# Patient Record
Sex: FEMALE | Race: WHITE | Employment: FULL TIME | URBAN - METROPOLITAN AREA
[De-identification: names, ages, dates, MRNs, and addresses within clinical notes are randomized per-mention and may not be internally consistent; named-entity substitution may affect disease eponyms.]

---

## 2023-10-30 PROCEDURE — 99215 OFFICE O/P EST HI 40 MIN: CPT | Performed by: INTERNAL MEDICINE

## 2023-12-04 ENCOUNTER — TELEPHONE (OUTPATIENT)
Dept: SURGICAL ONCOLOGY | Facility: HOSPITAL | Age: 63
End: 2023-12-04
Payer: COMMERCIAL

## 2023-12-04 NOTE — TELEPHONE ENCOUNTER
Called patient on 11/30/2023 to schedule a referral Gavino Beau received   For Dr. Basilio, stating she was a prior Rosaura Flores patient.  Patient stated she did not want to travel.  She wanted to stay within  [facility].  Pt stated she would call back if she decided to schedule  with Dr. Flores.

## 2024-01-24 DIAGNOSIS — R92.8 ABNORMAL FINDING ON BREAST IMAGING: ICD-10-CM

## 2024-01-29 ENCOUNTER — HOSPITAL ENCOUNTER (OUTPATIENT)
Dept: RADIOLOGY | Facility: HOSPITAL | Age: 64
Discharge: HOME | End: 2024-01-29
Payer: COMMERCIAL

## 2024-01-29 ENCOUNTER — OFFICE VISIT (OUTPATIENT)
Dept: SURGICAL ONCOLOGY | Facility: HOSPITAL | Age: 64
End: 2024-01-29
Payer: COMMERCIAL

## 2024-01-29 VITALS
SYSTOLIC BLOOD PRESSURE: 150 MMHG | HEIGHT: 63 IN | HEART RATE: 98 BPM | BODY MASS INDEX: 31.01 KG/M2 | DIASTOLIC BLOOD PRESSURE: 88 MMHG | WEIGHT: 175 LBS

## 2024-01-29 DIAGNOSIS — R97.0 ELEVATED CEA: ICD-10-CM

## 2024-01-29 DIAGNOSIS — R92.8 ABNORMAL FINDING ON BREAST IMAGING: Primary | ICD-10-CM

## 2024-01-29 DIAGNOSIS — C50.811 MALIGNANT NEOPLASM OF OVERLAPPING SITES OF RIGHT BREAST IN FEMALE, ESTROGEN RECEPTOR POSITIVE (MULTI): Primary | ICD-10-CM

## 2024-01-29 DIAGNOSIS — Z17.0 MALIGNANT NEOPLASM OF OVERLAPPING SITES OF RIGHT BREAST IN FEMALE, ESTROGEN RECEPTOR POSITIVE (MULTI): Primary | ICD-10-CM

## 2024-01-29 DIAGNOSIS — N63.11 MASS OF UPPER OUTER QUADRANT OF RIGHT BREAST: ICD-10-CM

## 2024-01-29 DIAGNOSIS — R92.8 ABNORMAL FINDING ON BREAST IMAGING: ICD-10-CM

## 2024-01-29 DIAGNOSIS — K22.719 BARRETT'S ESOPHAGUS WITH DYSPLASIA: ICD-10-CM

## 2024-01-29 PROCEDURE — 99214 OFFICE O/P EST MOD 30 MIN: CPT | Performed by: INTERNAL MEDICINE

## 2024-01-29 PROCEDURE — 76642 ULTRASOUND BREAST LIMITED: CPT | Mod: RT

## 2024-01-29 PROCEDURE — 99214 OFFICE O/P EST MOD 30 MIN: CPT | Mod: 27 | Performed by: SURGERY

## 2024-01-29 PROCEDURE — 99214 OFFICE O/P EST MOD 30 MIN: CPT | Performed by: SURGERY

## 2024-01-29 PROCEDURE — 76982 USE 1ST TARGET LESION: CPT | Mod: RT

## 2024-01-29 PROCEDURE — 76642 ULTRASOUND BREAST LIMITED: CPT | Mod: RIGHT SIDE | Performed by: STUDENT IN AN ORGANIZED HEALTH CARE EDUCATION/TRAINING PROGRAM

## 2024-01-29 RX ORDER — CALCIUM CARBONATE 500(1250)
1250 TABLET ORAL DAILY
COMMUNITY
Start: 2020-08-12

## 2024-01-29 RX ORDER — ATORVASTATIN CALCIUM 10 MG/1
10 TABLET, FILM COATED ORAL DAILY
COMMUNITY

## 2024-01-29 RX ORDER — ALENDRONATE SODIUM 70 MG/1
TABLET ORAL
COMMUNITY
Start: 2021-11-08

## 2024-01-29 RX ORDER — HYDROXYZINE HYDROCHLORIDE 25 MG/1
25 TABLET, FILM COATED ORAL
COMMUNITY
Start: 2021-11-03 | End: 2024-02-16 | Stop reason: ALTCHOICE

## 2024-01-29 RX ORDER — APIXABAN 5 MG/1
5 TABLET, FILM COATED ORAL 2 TIMES DAILY
COMMUNITY

## 2024-01-29 RX ORDER — CEFAZOLIN SODIUM 2 G/50ML
2 SOLUTION INTRAVENOUS ONCE
Status: CANCELLED | OUTPATIENT
Start: 2024-01-29 | End: 2024-01-29

## 2024-01-29 RX ORDER — VENLAFAXINE HYDROCHLORIDE 150 MG/1
150 TABLET, EXTENDED RELEASE ORAL DAILY
COMMUNITY

## 2024-01-29 RX ORDER — ANASTROZOLE 1 MG/1
1 TABLET ORAL NIGHTLY
COMMUNITY

## 2024-01-29 RX ORDER — VENLAFAXINE 75 MG/1
75 TABLET ORAL DAILY
COMMUNITY

## 2024-01-29 NOTE — PROGRESS NOTES
BREAST SURGICAL ONCOLOGY FOLLOW UP     Assessment/Plan     1. 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 family history of breast cancer  -sister with negative panel testing 5-6 years ago         3. Right chest wall cystic lesion favoring post surgical changes.   -will perform excision with resection of redundant skin for definitve diagnosis.    Discussed surgical excision of this area to provide definitive diagnosis to patient and oncology to rule out recurrence.  Discussed risk of wound healing in the setting of radiated tissue. We discussed need for a drain post op- possibly a Prevena.     She is scheduled for excisional biopsy right chest (breast) 3/1/2024.      Carri Porter is a 63 y.o. female presents today for follow up carcinoma of the right breast.     Treatment history  Patient presented with a chief complaint of swelling of the right breast. Was  seen in the ER where an ultrasound was initially performed revealing an abnormal  hypoechoic mass at 12:00 worrisome for possible malignancy. Further evaluation  with mammogram and possible repeat breast imaging was recommended.      She then went on to have bilateral diagnostic mammogram with tomosynthesis and a  targeted bilateral ultrasound on mammogram a spiculated 1.5 x 2.3 cm masses seen  at anterior depth in the right superior lateral breast and a 2.4 x 2.4  spiculated mass seen in the posterior depth of the right medial retroareolar  breast also a vague subsequent centimeter spiculated asymmetry seen in the  anterior depth of the left upper outer breast.     Targeted ultrasound was then performed right breast 11:00 6 cm from the nipple  1.5 x 1.2cm hypoechoic spiculated mass corresponding to the mammographic  asymmetry and sonography at 2:00 8 cm from the nipple right breast  poorly  defined 2 cm hypoechoic mass. In the right axilla there is a 1.9 x 1.1 cm  abnormal lymph node with markedly thickened cortex and acentrically displaced  hilum      Left breast 12:00 3 significant from the nipple there is a 7 x 3 mm cyst  corresponding to the mass mammographic asymmetry.     The findings in the right breast for noted to be highly suspicious (BIRADS 5)  and a 3 site biopsy was recommended.     She also notes recent inversion of the right nipple as well.     Final diagnosis     A. Right axillary lymph node, ultrasound-guided core biopsy: Metastatic carcinoma.  Comment: Tumor deposit measure 7 mm microscopically     B. right breast mass, 11 o'clock, ultrasound-guided core biopsy: Invasive ductal carcinoma, provisional grade 2-3  ER 95% OH 95% HER-2 by IHC equivocal, negative by dual JANY  Comment: tumor measure 1.6cm microscopically     C. right breast mass, 2:00, ultrasound guided core biopsy: Invasive ductal carcinoma, provisional grade 2-3  comment: Tumor measures 2.2 cm microscopically.  ER 95% OH 95% HER-2 negative     Final diagnosis  Right breast skin, skin punch biopsy: Fragments of skin with focal microcalcification negative for malignancy.     Staging work up negative for distant metastatic disease.     Completed neoadjuvant chemotherapy and was scheduled for a right modified radical mastectomy in March when she unfortunately experienced an acute aortic dissection necessitating emergent intervention with endovascular stenting by Dr. Arizmendi. After surgery, she had profound lower extremity weakness and was discharged from the hospital to inpatient rehab. Her case was discussed at our multi disciplinary tumor board and it was recommended she be placed on endocrine therapy while recovering from her aortic procedure.      Presented in May for a regroup. She had been maintained on endocrine therapy since March.      Follow up mammogram and U/S were performed 5/7/2020 and showed a decrease  in both breast masses compared to post neoadjuvant imaging.  Right 2:00 8cmFN measures 1.7 x 1.5 x 1.0cm (previously 2.1 x 1.2 x 2cm)  Right 11:00 6cmFN measures 0.7 x 0.6 x 0.3cm (1.6 x 1.3 x 0.7cm)     Breast is no longer edematous, nipple is everted, no redness of the breast.      8/12/2020 right modified radical mastectomy with wire localized axillary node dissection. 2 foci of residual carcinoma measuring 1mm and 2.3mm with significant therapy effect (90% and 95% within tumor beds). 3/8 lymph nodes positive. Margins negative.     Had some wound healing issues with mastectomy incision. Managed in the wound healing center.      PMRT with Dr. Bella- completed on 12/21/2020.     Doing well since radiation. No further wound issues.     Fell out of the car on 4/1/2021 and broke her right ankle. Non operative management.     Last seen by me in 2021.    Had a PET CT ordered by her oncologist with some vague, low FDG avid areas along the chest wall scar.  Presents today for further evaluation of me.    Targeted U/S of the chest wall today with a cystic lesion along the lateral aspect of the scar favoring post op changes.  Chloe has inquired if additional surgery is feasible to remove this area seen on PET and smooth the contour of the chest wall.      Review of Systems   Constitutional symptoms: Denies generalized fatigue.  Denies weight change, fevers/chills, difficulty sleeping   Eyes: Denies double vision, glaucoma, cataracts.  Ear/nose/throat/mouth: Denies hearing changes, sore throat, sinus problems.  Cardiovascular: No chest pain.  Denies irregular heartbeat.  Denies ankle swelling.  Respiratory: No wheezing, cough, or shortness of breath.  Gastrointestinal: No abdominal pain,  No nausea/vomiting.  No indigestion/heartburn.  No change in bowel habits.  No constipation or diarrhea.   Genitourinary: No urinary incontinence.  No urinary frequency.  No painful urination.  Musculoskeletal: No bone pain, no muscle pain,  no joint pain.   Integumentary: No rash. No masses.  No changes in moles.  No easy bruising.  Neurological: No headaches.  No tremors. No numbness/tingling.  Psychiatric: No anxiety. No depression.  Endocrine: No excessive thirst.  Not too hot or too cold.  Not tired or fatigued.    Hematological/lymphatic: No swollen glands or blood clotting problems.  No bruising.    Objective   Physical Exam  General: Alert and oriented x 3.  Mood and affect are appropriate.  HEENT: EOMI, PERRLA.   Neck: supple, no masses, no cervical adenopathy.  Cardiovascular: no lower extremity edema.  Pulmonary: breathing non labored on room air.  GI: Abdomen soft, no masses. No hepatomegaly or splenomegaly.  Lymph nodes: No supraclavicular or axillary adenopathy bilaterally.  Musculoskeletal: Full range of motion in the upper extremities bilaterally.  Neuro: denies dizziness, tremors    Breasts: The breasts were examined in both the seated and supine positions.    RIGHT: surgically absent.  There is some redundant tissue medially and laterally along the chest wall.  Lateral aspect of incision with history of delayed healing corresponds to the area seen on PET.  Tissues feels firm likely significant scarring though underlying recurrence could be a possibility with her history.  LEFT: The nipple is everted without nipple discharge.  There are no skin changes, skin thickening, or dimpling. There are no masses palpated in the LEFT breast.       Radiology review: All images and reports were personally reviewed.         Rosaura Flores DO

## 2024-02-16 ENCOUNTER — PRE-ADMISSION TESTING (OUTPATIENT)
Dept: PREADMISSION TESTING | Facility: HOSPITAL | Age: 64
End: 2024-02-16
Payer: COMMERCIAL

## 2024-02-16 ENCOUNTER — LAB (OUTPATIENT)
Dept: LAB | Facility: LAB | Age: 64
End: 2024-02-16
Payer: COMMERCIAL

## 2024-02-16 VITALS
HEART RATE: 103 BPM | HEIGHT: 63 IN | BODY MASS INDEX: 30.86 KG/M2 | TEMPERATURE: 97.3 F | OXYGEN SATURATION: 94 % | SYSTOLIC BLOOD PRESSURE: 155 MMHG | DIASTOLIC BLOOD PRESSURE: 86 MMHG | WEIGHT: 174.16 LBS | RESPIRATION RATE: 15 BRPM

## 2024-02-16 DIAGNOSIS — N63.11 MASS OF UPPER OUTER QUADRANT OF RIGHT BREAST: ICD-10-CM

## 2024-02-16 DIAGNOSIS — N60.11 BREAST CHANGES, FIBROCYSTIC, RIGHT: ICD-10-CM

## 2024-02-16 DIAGNOSIS — Z01.818 PREOP EXAMINATION: Primary | ICD-10-CM

## 2024-02-16 LAB
ANION GAP SERPL CALC-SCNC: 10 MMOL/L (ref 10–20)
ATRIAL RATE: 84 BPM
BUN SERPL-MCNC: 13 MG/DL (ref 6–23)
CALCIUM SERPL-MCNC: 9.3 MG/DL (ref 8.6–10.3)
CHLORIDE SERPL-SCNC: 106 MMOL/L (ref 98–107)
CO2 SERPL-SCNC: 30 MMOL/L (ref 21–32)
CREAT SERPL-MCNC: 0.76 MG/DL (ref 0.5–1.05)
EGFRCR SERPLBLD CKD-EPI 2021: 88 ML/MIN/1.73M*2
ERYTHROCYTE [DISTWIDTH] IN BLOOD BY AUTOMATED COUNT: 13.5 % (ref 11.5–14.5)
GLUCOSE SERPL-MCNC: 88 MG/DL (ref 74–99)
HCT VFR BLD AUTO: 45.3 % (ref 36–46)
HGB BLD-MCNC: 14 G/DL (ref 12–16)
MCH RBC QN AUTO: 30.4 PG (ref 26–34)
MCHC RBC AUTO-ENTMCNC: 30.9 G/DL (ref 32–36)
MCV RBC AUTO: 99 FL (ref 80–100)
NRBC BLD-RTO: 0 /100 WBCS (ref 0–0)
P AXIS: 55 DEGREES
P OFFSET: 205 MS
P ONSET: 151 MS
PLATELET # BLD AUTO: 225 X10*3/UL (ref 150–450)
POTASSIUM SERPL-SCNC: 4.3 MMOL/L (ref 3.5–5.3)
PR INTERVAL: 142 MS
Q ONSET: 222 MS
QRS COUNT: 14 BEATS
QRS DURATION: 82 MS
QT INTERVAL: 396 MS
QTC CALCULATION(BAZETT): 467 MS
QTC FREDERICIA: 442 MS
R AXIS: -5 DEGREES
RBC # BLD AUTO: 4.6 X10*6/UL (ref 4–5.2)
SODIUM SERPL-SCNC: 142 MMOL/L (ref 136–145)
T AXIS: 33 DEGREES
T OFFSET: 420 MS
VENTRICULAR RATE: 84 BPM
WBC # BLD AUTO: 6.7 X10*3/UL (ref 4.4–11.3)

## 2024-02-16 PROCEDURE — 99203 OFFICE O/P NEW LOW 30 MIN: CPT | Performed by: NURSE PRACTITIONER

## 2024-02-16 PROCEDURE — 93005 ELECTROCARDIOGRAM TRACING: CPT

## 2024-02-16 PROCEDURE — 80048 BASIC METABOLIC PNL TOTAL CA: CPT

## 2024-02-16 PROCEDURE — 36415 COLL VENOUS BLD VENIPUNCTURE: CPT

## 2024-02-16 PROCEDURE — 85027 COMPLETE CBC AUTOMATED: CPT

## 2024-02-16 PROCEDURE — 93010 ELECTROCARDIOGRAM REPORT: CPT | Performed by: INTERNAL MEDICINE

## 2024-02-16 RX ORDER — LEVOTHYROXINE SODIUM 50 UG/1
50 TABLET ORAL
COMMUNITY

## 2024-02-16 ASSESSMENT — ACTIVITIES OF DAILY LIVING (ADL): ADL_SCORE: 1

## 2024-02-16 ASSESSMENT — CHADS2 SCORE
CHF: NO
HYPERTENSION: NO
AGE GREATER THAN OR EQUAL TO 75: NO
PRIOR STROKE OR TIA OR THROMBOEMBOLISM: NO
CHADS2 SCORE: 0
DIABETES: NO

## 2024-02-16 ASSESSMENT — DUKE ACTIVITY SCORE INDEX (DASI)
CAN YOU DO MODERATE WORK AROUND THE HOUSE LIKE VACUUMING, SWEEPING FLOORS OR CARRYING GROCERIES: YES
CAN YOU DO YARD WORK LIKE RAKING LEAVES, WEEDING OR PUSHING A MOWER: NO
CAN YOU PARTICIPATE IN STRENOUS SPORTS LIKE SWIMMING, SINGLES TENNIS, FOOTBALL, BASKETBALL, OR SKIING: NO
CAN YOU TAKE CARE OF YOURSELF (EAT, DRESS, BATHE, OR USE TOILET): YES
CAN YOU HAVE SEXUAL RELATIONS: NO
CAN YOU DO LIGHT WORK AROUND THE HOUSE LIKE DUSTING OR WASHING DISHES: YES
CAN YOU DO HEAVY WORK AROUND THE HOUSE LIKE SCRUBBING FLOORS OR LIFTING AND MOVING HEAVY FURNITURE: NO
CAN YOU WALK INDOORS, SUCH AS AROUND YOUR HOUSE: YES
DASI METS SCORE: 4.7
CAN YOU WALK A BLOCK OR TWO ON LEVEL GROUND: NO
CAN YOU PARTICIPATE IN MODERATE RECREATIONAL ACTIVITIES LIKE GOLF, BOWLING, DANCING, DOUBLES TENNIS OR THROWING A BASEBALL OR FOOTBALL: NO
CAN YOU RUN A SHORT DISTANCE: NO
TOTAL_SCORE: 16.2
CAN YOU CLIMB A FLIGHT OF STAIRS OR WALK UP A HILL: YES

## 2024-02-16 ASSESSMENT — LIFESTYLE VARIABLES: SMOKING_STATUS: SMOKER

## 2024-02-16 NOTE — H&P (VIEW-ONLY)
CPM/PAT Evaluation       Name: Yecenia Porter (Yecenia Porter)  /Age: 1960/63 y.o.     In-Person     HPI 64 yo female with history of right breast cancer s/p mastectomy  here for preop evaluation for right breast changes on routine PET scan.    Past Medical History:   Diagnosis Date    Anxiety     Mcintyre's esophagus     Breast cancer (CMS/HCC)     Cervical disc disease     Depression     Easy bruising     Hx antineoplastic chemo     Hyperlipidemia     Hypothyroidism     Lumbar disc disease     Personal history of irradiation     Personal history of other mental and behavioral disorders     History of anxiety    Spinal stenosis        Past Surgical History:   Procedure Laterality Date    BACK SURGERY      Cervical and lumbar (pt does not know what was done).     SECTION, LOW TRANSVERSE      COLONOSCOPY      MASTECTOMY Right     ORIF WRIST FRACTURE      OTHER SURGICAL HISTORY      Abdominal aortic stent graft/tube graft for aortic dissection    OTHER SURGICAL HISTORY      Abdominal aortic and bilateral iliac thromboembolectomy    OTHER SURGICAL HISTORY      Right iliac covered stent; left iliac artery covered stent; right common femoral thrombectomy with patch; repair right common femoral artery dissection; repair blood vessel right femoral    UPPER GASTROINTESTINAL ENDOSCOPY         Patient  reports that she is not currently sexually active.    Family History   Problem Relation Name Age of Onset    Other (HTN) Mother      Other (HLD) Mother      Diabetes Father      Heart disease Father      Brain Aneurysm Father      Colon cancer Father      Diabetes Sister      Breast cancer Sister      No Known Problems Maternal Grandmother      No Known Problems Maternal Grandfather      No Known Problems Paternal Grandmother      Diabetes Paternal Grandfather         Allergies   Allergen Reactions    Prozac [Fluoxetine] Agitation       Prior to Admission medications    Medication Sig Start Date End  Date Taking? Authorizing Provider   alendronate (Fosamax) 70 mg tablet See Instructions, TAKE 1 TABLET BY MOUTH EVERY MONDAY 30 MINUTES BEFORE THE AM MEAL WITH WATER. STAY UPRIGHT FOR 30-60 MINUTES AFTER TAKING, 12 tabs, Valerio, [id number], Instructions Replace Required Details, TAKE 1 TABLET BY MOUTH EVERY... 11/8/21   Historical Provider, MD   anastrozole (Arimidex) 1 mg tablet     Historical Provider, MD   atorvastatin (Lipitor) 10 mg tablet     Historical Provider, MD   calcium carbonate (Oyster Shell Calcium 500) 500 mg calcium (1,250 mg) tablet 1 tablet (1,250 mg). 8/12/20   Historical Provider, MD   Eliquis 5 mg tablet Take 1 tablet (5 mg) by mouth 2 times a day.    Historical Provider, MD   venlafaxine (Effexor) 75 mg tablet Take by mouth.    Historical Provider, MD   venlafaxine 150 mg 24 hr tablet     Historical Provider, MD   hydrOXYzine HCL (Atarax) 25 mg tablet 1 tablet (25 mg). 11/3/21 2/16/24  Historical Provider, MD      Constitutional: Negative for fever, chills, or sweats   ENMT: Negative for nasal discharge, congestion, ear pain, mouth pain, throat pain   Respiratory: Negative for cough, wheezing, shortness of breath   Cardiac: Negative for chest pain, dyspnea on exertion, palpitations   Gastrointestinal: Negative for nausea, vomiting, diarrhea, constipation, abdominal pain  Genitourinary: see hpi   Musculoskeletal: Negative for decreased ROM, pain, swelling, weakness   Neurological: Negative for dizziness, confusion, headache  Psychiatric: Negative for mood changes   Skin: Negative for itching, rash, ulcer    Hematologic/Lymph: Negative for bruising, easy bleeding  Allergic/Immunologic: Negative itching, sneezing, swelling     CONSTITUTIONAL - well nourished, well developed, looks older than stated age  SKIN - small area of fungal type rash under right breast prosthesis. normal skin color and pigmentation, no lesions  HEAD - no trauma, normocephalic  EYES - glasses, pupils are equal  and reactive to light, extraocular muscles are intact  ENT - dentures, uvula midline, normal tongue movement and throat normal, no exudate, nasal passage without discharge and patent  NECK - supple without rigidity, full ROM  CHEST - clear to auscultation, no wheezing, no crackles and no rales, good effort  CARDIAC - regular rate and regular rhythm, no skipped beats, no murmur  ABDOMEN - no organomegaly, soft, nontender, nondistended, normal bowel sounds, no guarding/rebound/rigidity   EXTREMITIES - 1+ pitting edema LE, no deformities  NEUROLOGICAL - normal gait, normal balance, normal motor; alert, oriented and no focal signs  PSYCHIATRIC - alert, pleasant and cordial, age-appropriate  IMMUNOLOGIC - no cervical lymphadenopathy     PAT AIRWAY:   Airway:     Mallampati::  II    Neck ROM::  Full   upper dentures and lower dentures      Visit Vitals  /86   Pulse 103   Temp 36.3 °C (97.3 °F) (Temporal)   Resp 15     EKG: NSR HR 84 bpm  DASI Risk Score    No data to display       Caprini DVT Assessment    No data to display       Modified Frailty Index    No data to display       CHADS2 Stroke Risk  Current as of 23 minutes ago        N/A 3 - 100%: High Risk   2 - 3%: Medium Risk   0 - 2%: Low Risk     Last Change: N/A          This score determines the patient's risk of having a stroke if the patient has atrial fibrillation.        This score is not applicable to this patient. Components are not calculated.          Revised Cardiac Risk Index    No data to display       Apfel Simplified Score    No data to display       Risk Analysis Index Results This Encounter    No data found in the last 1 encounters.         Assessment and Plan:   64 yo female scheduled for right excisional breast biopsy with Dr. Flores 3/1/2024. Labs/EKG ordered by surgeon.    See risk scores as previously documented.

## 2024-02-16 NOTE — PREPROCEDURE INSTRUCTIONS
Medication List            Accurate as of February 16, 2024 11:35 AM. Always use your most recent med list.                alendronate 70 mg tablet  Commonly known as: Fosamax  Medication Adjustments for Surgery: Continue until night before surgery     anastrozole 1 mg tablet  Commonly known as: Arimidex  Medication Adjustments for Surgery: Take morning of surgery with sip of water, no other fluids     atorvastatin 10 mg tablet  Commonly known as: Lipitor  Medication Adjustments for Surgery: Take morning of surgery with sip of water, no other fluids     Eliquis 5 mg tablet  Generic drug: apixaban  Medication Adjustments for Surgery: Other (Comment)  Notes to patient: Per provider     levothyroxine 50 mcg tablet  Commonly known as: Synthroid, Levoxyl  Medication Adjustments for Surgery: Take morning of surgery with sip of water, no other fluids     Oyster Shell Calcium 500 500 mg calcium (1,250 mg) tablet  Generic drug: calcium carbonate  Medication Adjustments for Surgery: Stop 7 days before surgery     * venlafaxine 150 mg 24 hr tablet     * venlafaxine 75 mg tablet  Commonly known as: Effexor  Medication Adjustments for Surgery: Take morning of surgery with sip of water, no other fluids           * This list has 2 medication(s) that are the same as other medications prescribed for you. Read the directions carefully, and ask your doctor or other care provider to review them with you.                                           PRE-OPERATIVE INSTRUCTIONS    You will receive notification one business day prior to your surgery to confirm your arrival time and additional information. It is important that you answer your phone and/or check your messages during this time.    Please enter the building through the Outpatient entrance. Take the elevator off the lobby to the 2nd floor and check in at the Outpatient Surgery desk    INSTRUCTIONS:  Talk to your surgeon for instructions if you should stop your aspirin, blood  thinner, or diabetes medicines.  DO NOT take any multivitamins or over the counter supplements for 7-10 days before surgery.  If not being admitted, you must have an adult immediately available to drive you home after surgery. We also highly recommend you have someone stay with you for the entire day and night of your surgery.  For children having surgery, a parent or legal guardian must accompany them to the surgery center. If this is not possible, please call [phone] to make additional arrangements.  For adults who are unable to consent or make medical decisions for themselves, a legal guardian or Power of  must accompany them to the surgery center. If this is not possible, please call [phone] to make additional arrangements.  Wear comfortable, loose fitting clothing.  All jewelry and piercings must be removed. If you are unable to remove an item or have a dermal piercing, please be sure to tell the nurse when you arrive for surgery.  Nail polish and make-up must be removed.  Avoid smoking or consuming alcohol for 24 hours before surgery.  To help prevent infection, please take a shower/bath and wash your hair the night before and/or morning of surgery.    Additional instructions about eating and drinking before surgery:  Do not eat any solid foods after midnight. Milk, nutritional drinks/supplements, and infant formula are considered solid foods.  You may drink up to 12 oz. of clear liquids up to 2 hours before your arrival time for surgery, unless directed otherwise by your surgeon. Clear liquids include water, non-carbonated sports drinks (Gatorade), black tea or coffee (no creamers) and breast milk.    If you received a blue folder, please review additional information provided inside the folder regarding additional preparation.     If you have any questions or concerns, please call Pre-Admission Testing at [phone].                                  Preoperative Bathing  instructions    Follow these instructions the evening before and morning of surgery:  Do not shave the day before or day of surgery.  Remove all jewelry until after surgery. Take off rings and take out all body-piercing jewelry.  Use a clean wash cloth and towel.  Wash your face and hair with your normal soap and shampoo before you use the CHG soap.  Use a wash cloth to clean your skin with the CHG soap. Use enough CHG soap to cover the skin on your whole body. Use the same amount as you would with your normal soap.  Do not use the CHG soap on your face, eyes, ears, or head.  Do not scrub your skin too hard.  Be sure to clean the area well where surgery will be done.  Do not wash with your normal soap after the CHG soap.  Keep away from the water stream when you put CHG soap on. This keeps it from rinsing off too soon.  Rinse your body well.  Pat yourself dry with a clean, soft towel.  Put on clean clothing.  Do not put on any deodorants, lotions, or oils after showering. These might block how the CHG soap works.

## 2024-02-16 NOTE — CPM/PAT H&P
CPM/PAT Evaluation       Name: Yecenia Porter (Yecenia Porter)  /Age: 1960/63 y.o.     In-Person     HPI 62 yo female with history of right breast cancer s/p mastectomy  here for preop evaluation for right breast changes on routine PET scan.    Past Medical History:   Diagnosis Date    Anxiety     Mcintyre's esophagus     Breast cancer (CMS/HCC)     Cervical disc disease     Depression     Easy bruising     Hx antineoplastic chemo     Hyperlipidemia     Hypothyroidism     Lumbar disc disease     Personal history of irradiation     Personal history of other mental and behavioral disorders     History of anxiety    Spinal stenosis        Past Surgical History:   Procedure Laterality Date    BACK SURGERY      Cervical and lumbar (pt does not know what was done).     SECTION, LOW TRANSVERSE      COLONOSCOPY      MASTECTOMY Right     ORIF WRIST FRACTURE      OTHER SURGICAL HISTORY      Abdominal aortic stent graft/tube graft for aortic dissection    OTHER SURGICAL HISTORY      Abdominal aortic and bilateral iliac thromboembolectomy    OTHER SURGICAL HISTORY      Right iliac covered stent; left iliac artery covered stent; right common femoral thrombectomy with patch; repair right common femoral artery dissection; repair blood vessel right femoral    UPPER GASTROINTESTINAL ENDOSCOPY         Patient  reports that she is not currently sexually active.    Family History   Problem Relation Name Age of Onset    Other (HTN) Mother      Other (HLD) Mother      Diabetes Father      Heart disease Father      Brain Aneurysm Father      Colon cancer Father      Diabetes Sister      Breast cancer Sister      No Known Problems Maternal Grandmother      No Known Problems Maternal Grandfather      No Known Problems Paternal Grandmother      Diabetes Paternal Grandfather         Allergies   Allergen Reactions    Prozac [Fluoxetine] Agitation       Prior to Admission medications    Medication Sig Start Date End  Date Taking? Authorizing Provider   alendronate (Fosamax) 70 mg tablet See Instructions, TAKE 1 TABLET BY MOUTH EVERY MONDAY 30 MINUTES BEFORE THE AM MEAL WITH WATER. STAY UPRIGHT FOR 30-60 MINUTES AFTER TAKING, 12 tabs, Valerio, [id number], Instructions Replace Required Details, TAKE 1 TABLET BY MOUTH EVERY... 11/8/21   Historical Provider, MD   anastrozole (Arimidex) 1 mg tablet     Historical Provider, MD   atorvastatin (Lipitor) 10 mg tablet     Historical Provider, MD   calcium carbonate (Oyster Shell Calcium 500) 500 mg calcium (1,250 mg) tablet 1 tablet (1,250 mg). 8/12/20   Historical Provider, MD   Eliquis 5 mg tablet Take 1 tablet (5 mg) by mouth 2 times a day.    Historical Provider, MD   venlafaxine (Effexor) 75 mg tablet Take by mouth.    Historical Provider, MD   venlafaxine 150 mg 24 hr tablet     Historical Provider, MD   hydrOXYzine HCL (Atarax) 25 mg tablet 1 tablet (25 mg). 11/3/21 2/16/24  Historical Provider, MD      Constitutional: Negative for fever, chills, or sweats   ENMT: Negative for nasal discharge, congestion, ear pain, mouth pain, throat pain   Respiratory: Negative for cough, wheezing, shortness of breath   Cardiac: Negative for chest pain, dyspnea on exertion, palpitations   Gastrointestinal: Negative for nausea, vomiting, diarrhea, constipation, abdominal pain  Genitourinary: see hpi   Musculoskeletal: Negative for decreased ROM, pain, swelling, weakness   Neurological: Negative for dizziness, confusion, headache  Psychiatric: Negative for mood changes   Skin: Negative for itching, rash, ulcer    Hematologic/Lymph: Negative for bruising, easy bleeding  Allergic/Immunologic: Negative itching, sneezing, swelling     CONSTITUTIONAL - well nourished, well developed, looks older than stated age  SKIN - small area of fungal type rash under right breast prosthesis. normal skin color and pigmentation, no lesions  HEAD - no trauma, normocephalic  EYES - glasses, pupils are equal  and reactive to light, extraocular muscles are intact  ENT - dentures, uvula midline, normal tongue movement and throat normal, no exudate, nasal passage without discharge and patent  NECK - supple without rigidity, full ROM  CHEST - clear to auscultation, no wheezing, no crackles and no rales, good effort  CARDIAC - regular rate and regular rhythm, no skipped beats, no murmur  ABDOMEN - no organomegaly, soft, nontender, nondistended, normal bowel sounds, no guarding/rebound/rigidity   EXTREMITIES - 1+ pitting edema LE, no deformities  NEUROLOGICAL - normal gait, normal balance, normal motor; alert, oriented and no focal signs  PSYCHIATRIC - alert, pleasant and cordial, age-appropriate  IMMUNOLOGIC - no cervical lymphadenopathy     PAT AIRWAY:   Airway:     Mallampati::  II    Neck ROM::  Full   upper dentures and lower dentures      Visit Vitals  /86   Pulse 103   Temp 36.3 °C (97.3 °F) (Temporal)   Resp 15     EKG: NSR HR 84 bpm  DASI Risk Score    No data to display       Caprini DVT Assessment    No data to display       Modified Frailty Index    No data to display       CHADS2 Stroke Risk  Current as of 23 minutes ago        N/A 3 - 100%: High Risk   2 - 3%: Medium Risk   0 - 2%: Low Risk     Last Change: N/A          This score determines the patient's risk of having a stroke if the patient has atrial fibrillation.        This score is not applicable to this patient. Components are not calculated.          Revised Cardiac Risk Index    No data to display       Apfel Simplified Score    No data to display       Risk Analysis Index Results This Encounter    No data found in the last 1 encounters.         Assessment and Plan:   64 yo female scheduled for right excisional breast biopsy with Dr. Flores 3/1/2024. Labs/EKG ordered by surgeon.    See risk scores as previously documented.

## 2024-03-01 ENCOUNTER — HOSPITAL ENCOUNTER (OUTPATIENT)
Facility: HOSPITAL | Age: 64
Setting detail: OUTPATIENT SURGERY
Discharge: HOME | End: 2024-03-01
Attending: SURGERY | Admitting: SURGERY
Payer: COMMERCIAL

## 2024-03-01 ENCOUNTER — ANESTHESIA (OUTPATIENT)
Dept: OPERATING ROOM | Facility: HOSPITAL | Age: 64
End: 2024-03-01
Payer: COMMERCIAL

## 2024-03-01 ENCOUNTER — ANESTHESIA EVENT (OUTPATIENT)
Dept: OPERATING ROOM | Facility: HOSPITAL | Age: 64
End: 2024-03-01
Payer: COMMERCIAL

## 2024-03-01 VITALS
WEIGHT: 173 LBS | RESPIRATION RATE: 16 BRPM | HEART RATE: 93 BPM | OXYGEN SATURATION: 95 % | HEIGHT: 63 IN | SYSTOLIC BLOOD PRESSURE: 128 MMHG | TEMPERATURE: 97.5 F | BODY MASS INDEX: 30.65 KG/M2 | DIASTOLIC BLOOD PRESSURE: 64 MMHG

## 2024-03-01 DIAGNOSIS — R92.8 ABNORMAL FINDING ON BREAST IMAGING: Primary | ICD-10-CM

## 2024-03-01 DIAGNOSIS — N63.11 MASS OF UPPER OUTER QUADRANT OF RIGHT BREAST: ICD-10-CM

## 2024-03-01 PROCEDURE — 2500000004 HC RX 250 GENERAL PHARMACY W/ HCPCS (ALT 636 FOR OP/ED): Performed by: SURGERY

## 2024-03-01 PROCEDURE — 19120 REMOVAL OF BREAST LESION: CPT | Performed by: SURGERY

## 2024-03-01 PROCEDURE — 7100000010 HC PHASE TWO TIME - EACH INCREMENTAL 1 MINUTE: Performed by: SURGERY

## 2024-03-01 PROCEDURE — 2780000003 HC OR 278 NO HCPCS: Performed by: SURGERY

## 2024-03-01 PROCEDURE — C1889 IMPLANT/INSERT DEVICE, NOC: HCPCS | Performed by: SURGERY

## 2024-03-01 PROCEDURE — 2500000005 HC RX 250 GENERAL PHARMACY W/O HCPCS: Performed by: NURSE ANESTHETIST, CERTIFIED REGISTERED

## 2024-03-01 PROCEDURE — 3600000003 HC OR TIME - INITIAL BASE CHARGE - PROCEDURE LEVEL THREE: Performed by: SURGERY

## 2024-03-01 PROCEDURE — 2500000005 HC RX 250 GENERAL PHARMACY W/O HCPCS: Performed by: SURGERY

## 2024-03-01 PROCEDURE — A19120 PR EXCISE BREAST CYST: Performed by: NURSE ANESTHETIST, CERTIFIED REGISTERED

## 2024-03-01 PROCEDURE — 3700000002 HC GENERAL ANESTHESIA TIME - EACH INCREMENTAL 1 MINUTE: Performed by: SURGERY

## 2024-03-01 PROCEDURE — A19120 PR EXCISE BREAST CYST: Performed by: ANESTHESIOLOGY

## 2024-03-01 PROCEDURE — 2500000004 HC RX 250 GENERAL PHARMACY W/ HCPCS (ALT 636 FOR OP/ED): Performed by: NURSE ANESTHETIST, CERTIFIED REGISTERED

## 2024-03-01 PROCEDURE — 3700000001 HC GENERAL ANESTHESIA TIME - INITIAL BASE CHARGE: Performed by: SURGERY

## 2024-03-01 PROCEDURE — 2500000004 HC RX 250 GENERAL PHARMACY W/ HCPCS (ALT 636 FOR OP/ED): Performed by: ANESTHESIOLOGY

## 2024-03-01 PROCEDURE — 88307 TISSUE EXAM BY PATHOLOGIST: CPT | Performed by: PATHOLOGY

## 2024-03-01 PROCEDURE — 7100000001 HC RECOVERY ROOM TIME - INITIAL BASE CHARGE: Performed by: SURGERY

## 2024-03-01 PROCEDURE — 2500000002 HC RX 250 W HCPCS SELF ADMINISTERED DRUGS (ALT 637 FOR MEDICARE OP, ALT 636 FOR OP/ED): Performed by: NURSE ANESTHETIST, CERTIFIED REGISTERED

## 2024-03-01 PROCEDURE — 7100000002 HC RECOVERY ROOM TIME - EACH INCREMENTAL 1 MINUTE: Performed by: SURGERY

## 2024-03-01 PROCEDURE — 2720000007 HC OR 272 NO HCPCS: Performed by: SURGERY

## 2024-03-01 PROCEDURE — 88307 TISSUE EXAM BY PATHOLOGIST: CPT | Mod: TC,STJLAB | Performed by: SURGERY

## 2024-03-01 PROCEDURE — 7100000009 HC PHASE TWO TIME - INITIAL BASE CHARGE: Performed by: SURGERY

## 2024-03-01 PROCEDURE — 3600000008 HC OR TIME - EACH INCREMENTAL 1 MINUTE - PROCEDURE LEVEL THREE: Performed by: SURGERY

## 2024-03-01 DEVICE — IMPLANTABLE DEVICE: Type: IMPLANTABLE DEVICE | Site: BREAST | Status: FUNCTIONAL

## 2024-03-01 RX ORDER — HYDROCODONE BITARTRATE AND ACETAMINOPHEN 5; 325 MG/1; MG/1
1 TABLET ORAL EVERY 6 HOURS PRN
Qty: 20 TABLET | Refills: 0 | Status: SHIPPED | OUTPATIENT
Start: 2024-03-01 | End: 2024-03-08

## 2024-03-01 RX ORDER — HYDROCODONE BITARTRATE AND ACETAMINOPHEN 5; 325 MG/1; MG/1
1 TABLET ORAL EVERY 4 HOURS PRN
Status: DISCONTINUED | OUTPATIENT
Start: 2024-03-01 | End: 2024-03-01 | Stop reason: HOSPADM

## 2024-03-01 RX ORDER — PROPOFOL 10 MG/ML
INJECTION, EMULSION INTRAVENOUS AS NEEDED
Status: DISCONTINUED | OUTPATIENT
Start: 2024-03-01 | End: 2024-03-01

## 2024-03-01 RX ORDER — BUPIVACAINE HYDROCHLORIDE 2.5 MG/ML
INJECTION, SOLUTION INFILTRATION; PERINEURAL AS NEEDED
Status: DISCONTINUED | OUTPATIENT
Start: 2024-03-01 | End: 2024-03-01 | Stop reason: HOSPADM

## 2024-03-01 RX ORDER — KETOROLAC TROMETHAMINE 30 MG/ML
INJECTION, SOLUTION INTRAMUSCULAR; INTRAVENOUS AS NEEDED
Status: DISCONTINUED | OUTPATIENT
Start: 2024-03-01 | End: 2024-03-01

## 2024-03-01 RX ORDER — MIDAZOLAM HYDROCHLORIDE 1 MG/ML
INJECTION, SOLUTION INTRAMUSCULAR; INTRAVENOUS AS NEEDED
Status: DISCONTINUED | OUTPATIENT
Start: 2024-03-01 | End: 2024-03-01

## 2024-03-01 RX ORDER — LABETALOL HYDROCHLORIDE 5 MG/ML
5 INJECTION, SOLUTION INTRAVENOUS
Status: DISCONTINUED | OUTPATIENT
Start: 2024-03-01 | End: 2024-03-01 | Stop reason: HOSPADM

## 2024-03-01 RX ORDER — ALBUTEROL SULFATE 0.83 MG/ML
2.5 SOLUTION RESPIRATORY (INHALATION)
Status: DISCONTINUED | OUTPATIENT
Start: 2024-03-01 | End: 2024-03-01 | Stop reason: HOSPADM

## 2024-03-01 RX ORDER — DIPHENHYDRAMINE HYDROCHLORIDE 50 MG/ML
12.5 INJECTION INTRAMUSCULAR; INTRAVENOUS ONCE AS NEEDED
Status: DISCONTINUED | OUTPATIENT
Start: 2024-03-01 | End: 2024-03-01 | Stop reason: HOSPADM

## 2024-03-01 RX ORDER — MIDAZOLAM HYDROCHLORIDE 1 MG/ML
1 INJECTION, SOLUTION INTRAMUSCULAR; INTRAVENOUS ONCE AS NEEDED
Status: DISCONTINUED | OUTPATIENT
Start: 2024-03-01 | End: 2024-03-01 | Stop reason: HOSPADM

## 2024-03-01 RX ORDER — LIDOCAINE HYDROCHLORIDE 20 MG/ML
INJECTION, SOLUTION EPIDURAL; INFILTRATION; INTRACAUDAL; PERINEURAL AS NEEDED
Status: DISCONTINUED | OUTPATIENT
Start: 2024-03-01 | End: 2024-03-01

## 2024-03-01 RX ORDER — SODIUM CHLORIDE, SODIUM LACTATE, POTASSIUM CHLORIDE, CALCIUM CHLORIDE 600; 310; 30; 20 MG/100ML; MG/100ML; MG/100ML; MG/100ML
100 INJECTION, SOLUTION INTRAVENOUS CONTINUOUS
Status: DISCONTINUED | OUTPATIENT
Start: 2024-03-01 | End: 2024-03-01 | Stop reason: HOSPADM

## 2024-03-01 RX ORDER — ROCURONIUM BROMIDE 50 MG/5 ML
SYRINGE (ML) INTRAVENOUS AS NEEDED
Status: DISCONTINUED | OUTPATIENT
Start: 2024-03-01 | End: 2024-03-01

## 2024-03-01 RX ORDER — FENTANYL CITRATE 50 UG/ML
INJECTION, SOLUTION INTRAMUSCULAR; INTRAVENOUS AS NEEDED
Status: DISCONTINUED | OUTPATIENT
Start: 2024-03-01 | End: 2024-03-01

## 2024-03-01 RX ORDER — HYDROMORPHONE HYDROCHLORIDE 1 MG/ML
0.5 INJECTION, SOLUTION INTRAMUSCULAR; INTRAVENOUS; SUBCUTANEOUS EVERY 5 MIN PRN
Status: DISCONTINUED | OUTPATIENT
Start: 2024-03-01 | End: 2024-03-01 | Stop reason: HOSPADM

## 2024-03-01 RX ORDER — ONDANSETRON HYDROCHLORIDE 2 MG/ML
INJECTION, SOLUTION INTRAVENOUS AS NEEDED
Status: DISCONTINUED | OUTPATIENT
Start: 2024-03-01 | End: 2024-03-01

## 2024-03-01 RX ORDER — HYDROMORPHONE HYDROCHLORIDE 1 MG/ML
1 INJECTION, SOLUTION INTRAMUSCULAR; INTRAVENOUS; SUBCUTANEOUS EVERY 5 MIN PRN
Status: DISCONTINUED | OUTPATIENT
Start: 2024-03-01 | End: 2024-03-01 | Stop reason: HOSPADM

## 2024-03-01 RX ORDER — SODIUM CHLORIDE, SODIUM LACTATE, POTASSIUM CHLORIDE, CALCIUM CHLORIDE 600; 310; 30; 20 MG/100ML; MG/100ML; MG/100ML; MG/100ML
INJECTION, SOLUTION INTRAVENOUS CONTINUOUS PRN
Status: DISCONTINUED | OUTPATIENT
Start: 2024-03-01 | End: 2024-03-01

## 2024-03-01 RX ORDER — METOCLOPRAMIDE HYDROCHLORIDE 5 MG/ML
10 INJECTION INTRAMUSCULAR; INTRAVENOUS ONCE AS NEEDED
Status: DISCONTINUED | OUTPATIENT
Start: 2024-03-01 | End: 2024-03-01 | Stop reason: HOSPADM

## 2024-03-01 RX ORDER — PHENYLEPHRINE HCL IN 0.9% NACL 1 MG/10 ML
SYRINGE (ML) INTRAVENOUS AS NEEDED
Status: DISCONTINUED | OUTPATIENT
Start: 2024-03-01 | End: 2024-03-01

## 2024-03-01 RX ORDER — HYDRALAZINE HYDROCHLORIDE 20 MG/ML
5 INJECTION INTRAMUSCULAR; INTRAVENOUS EVERY 30 MIN PRN
Status: DISCONTINUED | OUTPATIENT
Start: 2024-03-01 | End: 2024-03-01 | Stop reason: HOSPADM

## 2024-03-01 RX ORDER — CEFAZOLIN SODIUM 2 G/100ML
2 INJECTION, SOLUTION INTRAVENOUS ONCE
Status: COMPLETED | OUTPATIENT
Start: 2024-03-01 | End: 2024-03-01

## 2024-03-01 RX ORDER — DEXAMETHASONE SODIUM PHOSPHATE 4 MG/ML
INJECTION, SOLUTION INTRA-ARTICULAR; INTRALESIONAL; INTRAMUSCULAR; INTRAVENOUS; SOFT TISSUE AS NEEDED
Status: DISCONTINUED | OUTPATIENT
Start: 2024-03-01 | End: 2024-03-01

## 2024-03-01 RX ORDER — ALBUTEROL SULFATE 90 UG/1
AEROSOL, METERED RESPIRATORY (INHALATION) AS NEEDED
Status: DISCONTINUED | OUTPATIENT
Start: 2024-03-01 | End: 2024-03-01

## 2024-03-01 RX ADMIN — PROPOFOL 50 MG: 10 INJECTION, EMULSION INTRAVENOUS at 07:49

## 2024-03-01 RX ADMIN — SODIUM CHLORIDE, POTASSIUM CHLORIDE, SODIUM LACTATE AND CALCIUM CHLORIDE: 600; 310; 30; 20 INJECTION, SOLUTION INTRAVENOUS at 07:28

## 2024-03-01 RX ADMIN — ALBUTEROL SULFATE 10 PUFF: 108 INHALANT RESPIRATORY (INHALATION) at 07:52

## 2024-03-01 RX ADMIN — DEXAMETHASONE SODIUM PHOSPHATE 4 MG: 4 INJECTION INTRA-ARTICULAR; INTRALESIONAL; INTRAMUSCULAR; INTRAVENOUS; SOFT TISSUE at 07:42

## 2024-03-01 RX ADMIN — HYDROMORPHONE HYDROCHLORIDE 0.5 MG: 1 INJECTION, SOLUTION INTRAMUSCULAR; INTRAVENOUS; SUBCUTANEOUS at 09:42

## 2024-03-01 RX ADMIN — ALBUTEROL SULFATE 10 PUFF: 108 INHALANT RESPIRATORY (INHALATION) at 07:50

## 2024-03-01 RX ADMIN — Medication 100 MCG: at 08:18

## 2024-03-01 RX ADMIN — Medication 30 MG: at 07:49

## 2024-03-01 RX ADMIN — ONDANSETRON 4 MG: 2 INJECTION, SOLUTION INTRAMUSCULAR; INTRAVENOUS at 08:20

## 2024-03-01 RX ADMIN — Medication 20 MG: at 07:46

## 2024-03-01 RX ADMIN — LIDOCAINE HYDROCHLORIDE 80 MG: 20 INJECTION, SOLUTION EPIDURAL; INFILTRATION; INTRACAUDAL; PERINEURAL at 07:32

## 2024-03-01 RX ADMIN — KETOROLAC TROMETHAMINE 30 MG: 30 INJECTION, SOLUTION INTRAMUSCULAR; INTRAVENOUS at 09:04

## 2024-03-01 RX ADMIN — Medication 100 MCG: at 08:21

## 2024-03-01 RX ADMIN — FENTANYL CITRATE 50 MCG: 50 INJECTION, SOLUTION INTRAMUSCULAR; INTRAVENOUS at 07:48

## 2024-03-01 RX ADMIN — PROPOFOL 150 MG: 10 INJECTION, EMULSION INTRAVENOUS at 07:32

## 2024-03-01 RX ADMIN — CEFAZOLIN SODIUM 2 G: 2 INJECTION, SOLUTION INTRAVENOUS at 07:40

## 2024-03-01 RX ADMIN — MIDAZOLAM 2 MG: 1 INJECTION INTRAMUSCULAR; INTRAVENOUS at 07:28

## 2024-03-01 RX ADMIN — FENTANYL CITRATE 50 MCG: 50 INJECTION, SOLUTION INTRAMUSCULAR; INTRAVENOUS at 08:08

## 2024-03-01 SDOH — HEALTH STABILITY: MENTAL HEALTH: CURRENT SMOKER: 1

## 2024-03-01 ASSESSMENT — COLUMBIA-SUICIDE SEVERITY RATING SCALE - C-SSRS
2. HAVE YOU ACTUALLY HAD ANY THOUGHTS OF KILLING YOURSELF?: NO
1. IN THE PAST MONTH, HAVE YOU WISHED YOU WERE DEAD OR WISHED YOU COULD GO TO SLEEP AND NOT WAKE UP?: NO
6. HAVE YOU EVER DONE ANYTHING, STARTED TO DO ANYTHING, OR PREPARED TO DO ANYTHING TO END YOUR LIFE?: NO

## 2024-03-01 ASSESSMENT — PAIN - FUNCTIONAL ASSESSMENT
PAIN_FUNCTIONAL_ASSESSMENT: 0-10

## 2024-03-01 ASSESSMENT — PAIN SCALES - GENERAL
PAINLEVEL_OUTOF10: 0 - NO PAIN
PAINLEVEL_OUTOF10: 0 - NO PAIN
PAINLEVEL_OUTOF10: 4
PAINLEVEL_OUTOF10: 4
PAINLEVEL_OUTOF10: 6
PAINLEVEL_OUTOF10: 0 - NO PAIN
PAINLEVEL_OUTOF10: 6

## 2024-03-01 ASSESSMENT — PAIN DESCRIPTION - ORIENTATION: ORIENTATION: RIGHT

## 2024-03-01 ASSESSMENT — PAIN DESCRIPTION - LOCATION: LOCATION: BREAST

## 2024-03-01 ASSESSMENT — PAIN DESCRIPTION - DESCRIPTORS: DESCRIPTORS: SORE

## 2024-03-01 NOTE — DISCHARGE INSTRUCTIONS
DR. FARRIS'S BREAST SURGERY DISCHARGE INSTRUCTIONS    Wound Care    Do not remove the surgical bra for 24 hours.  Wear the bra to bed to reduce movement.  Your incisions are covered with steri strips.  Leave these in place until they begin to lift from the edges.  If steri strips are still in place after 10 days you may remove the strips.  You can place ice on your breast as needed for pain relief  (20 minutes on / 20 minutes off).    Activity    You may shower after 24 hours, but no baths, tubs, or swimming for 2 weeks.  Do not lift anything more than 10lbs or do any strenuous activity until seen in post op.  You may drive when you are not taking narcotic pain medication.    Medication    Take prescription  narcotic medication for severe pain.  Do not drive while taking narcotics.  You may take acetaminophen (Tylenol), ibuprofen (Motrin), or naproxen (Aleve) for pain.    When to Call    Your wound is red, swollen, or has a lot of bleeding or drainage.  Your pain is not controlled by the medicines.  You have a fever or 100F or greater.    Drains    Empty and record output from each drain three times a day.  Ok to shower with drains.  Keep drain site covered with clear adhesive dressing (Tegaderm) or gauze.  Bring record of your drain output to your post operative visit for Dr. Farris to review.    Prevena    You have a negative pressure dressing to help heal your incision.  The purple foam should remain contracted down and the vac should remain on at all times.  Plug vac into a wall outlet at night and when you are at home.  Vac battery can last for several hours unplugged.  Vac will automatically shut off after 7 days and will need to be removed in the office.      Follow Up    Dr. Farris will review your pathology results at your post-op visit.  Please call the office at [phone] to make a follow up appointment.

## 2024-03-01 NOTE — ANESTHESIA PREPROCEDURE EVALUATION
Patient: Yecenia Porter    Procedure Information       Date/Time: 24    Procedure: RIGHT EXCISIONAL BREAST BIOPSY (Right)    Location: [facility] OR 07 / [facility]    Surgeons: Rosaura Flores, DO            Relevant Problems   No relevant active problems       Clinical information reviewed:    Allergies  Meds               NPO Detail:  NPO/Void Status  Carbohydrate Drink Given Prior to Surgery? : N  Date of Last Liquid: 24  Time of Last Liquid:   Date of Last Solid: 24  Time of Last Solid:   Last Intake Type: Clear fluids  Time of Last Void: 600         Physical Exam    Airway  Mallampati: II  TM distance: >3 FB  Neck ROM: full     Cardiovascular - normal exam     Dental   (+) upper dentures, lower dentures     Pulmonary - normal exam     Abdominal - normal exam           Vitals:    24 0700   BP: 129/63   Pulse: 84   Resp: 16   Temp: 36.6 °C (97.9 °F)   SpO2: 96%       Past Surgical History:   Procedure Laterality Date    BACK SURGERY      Cervical and lumbar (pt does not know what was done).     SECTION, LOW TRANSVERSE      COLONOSCOPY      MASTECTOMY Right 2020    ORIF WRIST FRACTURE      OTHER SURGICAL HISTORY      Abdominal aortic stent graft/tube graft for aortic dissection    OTHER SURGICAL HISTORY      Abdominal aortic and bilateral iliac thromboembolectomy    OTHER SURGICAL HISTORY      Right iliac covered stent; left iliac artery covered stent; right common femoral thrombectomy with patch; repair right common femoral artery dissection; repair blood vessel right femoral    UPPER GASTROINTESTINAL ENDOSCOPY       Past Medical History:   Diagnosis Date    Anxiety     Mcintyre's esophagus     Breast cancer (CMS/HCC)     Cervical disc disease     Depression     Easy bruising     Hx antineoplastic chemo     Hyperlipidemia     Hypothyroidism     Lumbar disc disease     Personal history of irradiation     Personal history of other mental and behavioral disorders      History of anxiety    Spinal stenosis        Current Facility-Administered Medications:     ceFAZolin in dextrose (iso-os) (Ancef) IVPB 2 g, 2 g, intravenous, Once, Rosaura Flores DO    Facility-Administered Medications Ordered in Other Encounters:     lactated Ringer's infusion, , intravenous, Continuous PRN, Agnes Kevin, APRN-CRNA, New Bag at 03/01/24 0721  Prior to Admission medications    Medication Sig Start Date End Date Taking? Authorizing Provider   alendronate (Fosamax) 70 mg tablet See Instructions, TAKE 1 TABLET BY MOUTH EVERY MONDAY 30 MINUTES BEFORE THE AM MEAL WITH WATER. STAY UPRIGHT FOR 30-60 MINUTES AFTER TAKING, 12 tabs, , [id number], Instructions Replace Required Details, TAKE 1 TABLET BY MOUTH EVERY... 11/8/21  Yes Historical Provider, MD   anastrozole (Arimidex) 1 mg tablet 1 tablet (1 mg total) once daily at bedtime.   Yes Historical Provider, MD   atorvastatin (Lipitor) 10 mg tablet Take 1 tablet (10 mg) by mouth once daily.   Yes Historical Provider, MD   calcium carbonate (Oyster Shell Calcium 500) 500 mg calcium (1,250 mg) tablet Take 1 tablet (1,250 mg) by mouth once daily. 8/12/20  Yes Historical Provider, MD   Eliquis 5 mg tablet Take 1 tablet (5 mg) by mouth 2 times a day.   Yes Historical Provider, MD   levothyroxine (Synthroid, Levoxyl) 50 mcg tablet Take 1 tablet (50 mcg) by mouth once daily in the morning. Take before meals.   Yes Historical Provider, MD   venlafaxine (Effexor) 75 mg tablet Take 1 tablet (75 mg) by mouth once daily.   Yes Historical Provider, MD   venlafaxine 150 mg 24 hr tablet 1 tablet (150 mg) once daily.   Yes Historical Provider, MD     Allergies   Allergen Reactions    Prozac [Fluoxetine] Agitation     Social History     Tobacco Use    Smoking status: Every Day     Packs/day: .25     Types: Cigarettes     Passive exposure: Past    Smokeless tobacco: Never   Substance Use Topics    Alcohol use: Never         Chemistry    Lab Results  "  Component Value Date/Time     02/16/2024 1152    K 4.3 02/16/2024 1152     02/16/2024 1152    CO2 30 02/16/2024 1152    BUN 13 02/16/2024 1152    CREATININE 0.76 02/16/2024 1152    Lab Results   Component Value Date/Time    CALCIUM 9.3 02/16/2024 1152          Lab Results   Component Value Date/Time    WBC 6.7 02/16/2024 1152    HGB 14.0 02/16/2024 1152    HCT 45.3 02/16/2024 1152     02/16/2024 1152     No results found for: \"PROTIME\", \"PTT\", \"INR\"  Encounter Date: 02/16/24   ECG 12 lead (Ancillary Performed)   Result Value    Ventricular Rate 84    Atrial Rate 84    TN Interval 142    QRS Duration 82    QT Interval 396    QTC Calculation(Bazett) 467    P Axis 55    R Axis -5    T Axis 33    QRS Count 14    Q Onset 222    P Onset 151    P Offset 205    T Offset 420    QTC Fredericia 442    Narrative    Normal sinus rhythm  Possible Left atrial enlargement  RSR' pattern in V1  Borderline ECG  No previous ECGs available  Confirmed by Sancho Carey ([id number]) on 2/16/2024 4:34:43 PM        Anesthesia Plan    History of general anesthesia?: yes  History of complications of general anesthesia?: no    ASA 2     general     The patient is a current smoker.  Patient was previously instructed to abstain from smoking on day of procedure.  Patient smoked on day of procedure.    intravenous induction   Anesthetic plan and risks discussed with patient.  Use of blood products discussed with patient who.    Plan discussed with attending and CRNA.      "

## 2024-03-01 NOTE — ANESTHESIA POSTPROCEDURE EVALUATION
Patient: Yecenia Porter    Procedure Summary       Date: 03/01/24 Room / Location: [facility] OR  / [facility]    Anesthesia Start: 0728 Anesthesia Stop: 0938    Procedure: RIGHT EXCISIONAL BREAST BIOPSY (Right) Diagnosis:       Mass of upper outer quadrant of right breast      (Mass of upper outer quadrant of right breast [N63.11])    Surgeons: Rosaura Flores DO Responsible Provider: Kimani Ontiveros MD    Anesthesia Type: general ASA Status: 2            Anesthesia Type: general    Vitals Value Taken Time   /56 03/01/24 1100   Temp 36 °C (96.8 °F) 03/01/24 1100   Pulse 85 03/01/24 1103   Resp 12 03/01/24 1103   SpO2 94 % 03/01/24 1103   Vitals shown include unvalidated device data.    Anesthesia Post Evaluation    Patient location during evaluation: PACU  Patient participation: complete - patient participated  Level of consciousness: awake and alert  Pain management: satisfactory to patient  Airway patency: patent  Cardiovascular status: acceptable  Respiratory status: acceptable  Hydration status: euvolemic  Postoperative Nausea and Vomiting: none        No notable events documented.

## 2024-03-01 NOTE — ANESTHESIA PROCEDURE NOTES
Airway  Date/Time: 3/1/2024 7:34 AM  Urgency: elective    Airway not difficult    Staffing  Performed: CRNA   Authorized by: Kimani Ontiveros MD    Performed by: LEE Vuong-CRNA  Patient location during procedure: OR    Indications and Patient Condition  Indications for airway management: anesthesia  Spontaneous ventilation: present  Sedation level: deep  Preoxygenated: yes  Patient position: sniffing  Mask difficulty assessment: 0 - not attempted    Final Airway Details  Final airway type: supraglottic airway      Successful airway: Supraglottic airway: iGel.  Size 4     Number of attempts at approach: 1

## 2024-03-01 NOTE — OP NOTE
Date: 3/1/2024  OR Location: [facility] OR    Name: Yecenia Porter YOB: 1960, Age: 63 y.o., MRN: [id number], Sex: female    Diagnosis  Pre-op Diagnosis     * Mass of upper outer quadrant of right breast [N63.11] Post-op Diagnosis     * Mass of upper outer quadrant of right breast [N63.11]     Procedures  RIGHT EXCISIONAL BREAST BIOPSY   - MA EXC CYST/ABERRANT BREAST TISSUE OPEN 1/> LESION      Surgeons      * Rosaura Flores - Primary    Resident/Fellow/Other Assistant:  Surgeon(s) and Role:    Procedure Summary  Anesthesia: General  ASA: II  Anesthesia Staff: Anesthesiologist: Kimani Ontiveros MD  CRNA: LEE Vuong-CRNA  Estimated Blood Loss: 10mL    Staff:   Circulator: Fouzia Bryan RN  Scrub Person: Linda Genao RN    Details of Procedure:    The patient was identified by name and birthdate and transported to the operative suite and placed supine on the OR table.  A sign-in was performed verifying patient identity, procedure and laterality.  One dose of preoperative antibiotics was given.  After induction of anesthesia the right chest and axilla were prepped and draped in the usual sterile fashion.   Just prior to incision, a time-out was performed confirming patient identity, procedure and laterality.  An elliptical incision was made encompassing the prior mastectomy scar, and flaps were raised circumferentially to excise the scar, redundant tissue, and chest wall mass appreciated on PET CT. The target lesion was then circumferentially dissected using electrocautery.  Once the specimen was completely dissected, it was marked for orientation using a silk suture- short suture superior, long lateral and passed off the field.  One 15 Romansh channel drain was placed in the wound bed and brought out through a separate stab incision and secured with 3-0 Ethilon. Next, meticulous hemostasis was achieved.  Injected a total of 200mg of NEOX GLO in the subcutaneous tissue of the orlin wound to aide in  tissue healing. The incision was temporarily closed with staples The dermis was closed with 2-0 Vicryl suture and staples were removed.  The skin was closed with a running 4-0 Monocryl.  Additional 4-0 interrupted prolene sutures were used to reinforce the incision and minimize wound dehiscence given prior radiation.  A 35cm negative pressure Prevena Plus was used as dressing to optimize wound healing. The patient was then awoken from anesthesia and transported to the PACU in satisfactory condition.    SPECIMEN:    Right chest wall / breast excision- short suture superior, long lateral

## 2024-03-04 ENCOUNTER — PATIENT MESSAGE (OUTPATIENT)
Dept: SURGICAL ONCOLOGY | Facility: HOSPITAL | Age: 64
End: 2024-03-04
Payer: COMMERCIAL

## 2024-03-04 NOTE — TELEPHONE ENCOUNTER
From: Yecenia Porter  To: Rosaura Flores DO  Sent: 3/4/2024 10:57 AM EST  Subject: Dressing change     What time do I come in to get my dressing changed on Friday?    Thanks  Yecenia Porter

## 2024-03-07 LAB
LABORATORY COMMENT REPORT: NORMAL
PATH REPORT.FINAL DX SPEC: NORMAL
PATH REPORT.GROSS SPEC: NORMAL
PATH REPORT.RELEVANT HX SPEC: NORMAL
PATH REPORT.TOTAL CANCER: NORMAL
RESIDENT REVIEW: NORMAL

## 2024-03-08 ENCOUNTER — DOCUMENTATION (OUTPATIENT)
Dept: SURGICAL ONCOLOGY | Facility: HOSPITAL | Age: 64
End: 2024-03-08
Payer: COMMERCIAL

## 2024-03-08 PROBLEM — I77.77: Status: ACTIVE | Noted: 2024-03-08

## 2024-03-08 PROBLEM — T82.868A: Status: ACTIVE | Noted: 2024-03-08

## 2024-03-08 PROBLEM — I74.5 ILIAC ARTERY OCCLUSION (MULTI): Status: ACTIVE | Noted: 2024-03-08

## 2024-03-08 PROBLEM — M54.50 LUMBAGO: Status: ACTIVE | Noted: 2024-03-08

## 2024-03-08 PROBLEM — L82.1 BASAL CELL PAPILLOMA: Status: ACTIVE | Noted: 2022-06-21

## 2024-03-08 PROBLEM — L53.9 BREAST ERYTHEMA: Status: ACTIVE | Noted: 2024-03-08

## 2024-03-08 PROBLEM — E61.1 IRON DEFICIENCY: Status: ACTIVE | Noted: 2024-03-08

## 2024-03-08 PROBLEM — K22.719 BARRETT'S ESOPHAGUS WITH DYSPLASIA: Status: ACTIVE | Noted: 2024-03-08

## 2024-03-08 PROBLEM — I70.0 AORTIC OCCLUSION (CMS-HCC): Status: ACTIVE | Noted: 2020-05-07

## 2024-03-08 PROBLEM — E78.01 FAMILIAL HYPERCHOLESTEROLEMIA: Status: ACTIVE | Noted: 2024-03-08

## 2024-03-08 PROBLEM — S52.90XA RADIUS FRACTURE: Status: ACTIVE | Noted: 2022-05-25

## 2024-03-08 PROBLEM — G58.8 PERIPHERAL NEUROPATHY DUE TO ISCHEMIA: Status: ACTIVE | Noted: 2024-03-08

## 2024-03-08 PROBLEM — F41.9 ANXIETY: Status: ACTIVE | Noted: 2022-06-21

## 2024-03-08 PROBLEM — T45.1X5A CHEMOTHERAPY-INDUCED PERIPHERAL NEUROPATHY (MULTI): Status: ACTIVE | Noted: 2024-03-08

## 2024-03-08 PROBLEM — L57.0 SENILE HYPERKERATOSIS: Status: ACTIVE | Noted: 2024-03-08

## 2024-03-08 PROBLEM — C50.911: Status: ACTIVE | Noted: 2024-03-08

## 2024-03-08 PROBLEM — C50.811 MALIGNANT NEOPLASM OF OVERLAPPING SITES OF RIGHT FEMALE BREAST (MULTI): Status: ACTIVE | Noted: 2024-01-29

## 2024-03-08 PROBLEM — M81.0 OSTEOPOROSIS OF MULTIPLE SITES WITHOUT PATHOLOGICAL FRACTURE: Status: ACTIVE | Noted: 2024-03-08

## 2024-03-08 PROBLEM — F42.9 OCD (OBSESSIVE COMPULSIVE DISORDER): Status: ACTIVE | Noted: 2024-03-08

## 2024-03-08 PROBLEM — Z86.79 HISTORY OF AORTIC DISSECTION: Status: ACTIVE | Noted: 2020-05-07

## 2024-03-08 PROBLEM — Z85.3 PERSONAL HISTORY OF MALIGNANT NEOPLASM OF BREAST: Status: ACTIVE | Noted: 2022-06-21

## 2024-03-08 PROBLEM — E55.9 VITAMIN D DEFICIENCY: Status: ACTIVE | Noted: 2024-01-29

## 2024-03-08 PROBLEM — F32.1 MAJOR DEPRESSIVE DISORDER, SINGLE EPISODE, MODERATE (MULTI): Status: ACTIVE | Noted: 2024-03-08

## 2024-03-08 PROBLEM — M96.1 LUMBAR POST-LAMINECTOMY SYNDROME: Status: ACTIVE | Noted: 2024-03-08

## 2024-03-08 PROBLEM — E03.4 ACQUIRED ATROPHY OF THYROID: Status: ACTIVE | Noted: 2022-06-21

## 2024-03-08 PROBLEM — G62.89 PERIPHERAL NEUROPATHY DUE TO ISCHEMIA: Status: ACTIVE | Noted: 2024-03-08

## 2024-03-08 PROBLEM — Z95.820 S/P PERIPHERAL ARTERY ANGIOPLASTY WITH STENT PLACEMENT: Status: ACTIVE | Noted: 2024-03-08

## 2024-03-08 PROBLEM — G47.00 INSOMNIA: Status: ACTIVE | Noted: 2024-03-08

## 2024-03-08 PROBLEM — M79.604 BILATERAL LEG PAIN: Status: ACTIVE | Noted: 2024-03-08

## 2024-03-08 PROBLEM — F41.1 GENERALIZED ANXIETY DISORDER: Status: ACTIVE | Noted: 2022-04-04

## 2024-03-08 PROBLEM — R97.0 ELEVATED CEA: Status: ACTIVE | Noted: 2024-01-29

## 2024-03-08 PROBLEM — G62.0 CHEMOTHERAPY-INDUCED PERIPHERAL NEUROPATHY (MULTI): Status: ACTIVE | Noted: 2024-03-08

## 2024-03-08 PROBLEM — I73.9 LEFT LEG CLAUDICATION (CMS-HCC): Status: ACTIVE | Noted: 2024-03-08

## 2024-03-08 PROBLEM — M79.605 BILATERAL LEG PAIN: Status: ACTIVE | Noted: 2024-03-08

## 2024-03-08 PROBLEM — I74.9 EMBOLISM AND THROMBOSIS OF ARTERY (MULTI): Status: ACTIVE | Noted: 2024-03-08

## 2024-03-08 PROBLEM — T45.1X5A ADVERSE EFFECT OF ANTINEOPLASTIC DRUG: Status: ACTIVE | Noted: 2022-06-21

## 2024-03-08 PROBLEM — M48.061 LUMBAR SPINAL STENOSIS: Status: ACTIVE | Noted: 2022-06-21

## 2024-03-08 PROBLEM — M54.17 LUMBOSACRAL RADICULOPATHY: Status: ACTIVE | Noted: 2024-03-08

## 2024-03-08 NOTE — PROGRESS NOTES
Ms. Porter presented for removal of Prevena wound vac.  Disconnected without incident.  Incision line intact with no open areas noted.  Drain record shows output of 20ml, 25ml and 25ml for previous 3 days.  Patient requested drain removal.  Removed without difficulty.  Clean, dry dressing applied to drain site.

## 2024-03-15 NOTE — PROGRESS NOTES
BREAST SURGICAL ONCOLOGY FOLLOW UP     Assessment/Plan     1. 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 family history of breast cancer  -sister with negative panel testing 5-6 years ago         3. Right chest wall cystic lesion favoring post surgical changes.   -s/p excision with benign, post surgical changes.      Pathology report with benign findings.  Incision is healing well.  Has follow up with adriana Arciniega in May.  Follow up with me in December at time of left mammogram or sooner with any breast concerns.    Subjective   Yecenia Porter is a 63 y.o. female presents today for post carcinoma of the right breast.     Treatment history  Patient presented with a chief complaint of swelling of the right breast. Was  seen in the ER where an ultrasound was initially performed revealing an abnormal  hypoechoic mass at 12:00 worrisome for possible malignancy. Further evaluation  with mammogram and possible repeat breast imaging was recommended.      She then went on to have bilateral diagnostic mammogram with tomosynthesis and a  targeted bilateral ultrasound on mammogram a spiculated 1.5 x 2.3 cm masses seen  at anterior depth in the right superior lateral breast and a 2.4 x 2.4  spiculated mass seen in the posterior depth of the right medial retroareolar  breast also a vague subsequent centimeter spiculated asymmetry seen in the  anterior depth of the left upper outer breast.     Targeted ultrasound was then performed right breast 11:00 6 cm from the nipple  1.5 x 1.2cm hypoechoic spiculated mass corresponding to the mammographic  asymmetry and sonography at 2:00 8 cm from the nipple right breast poorly  defined 2 cm hypoechoic mass. In the right axilla there is a 1.9 x 1.1 cm  abnormal lymph node with markedly thickened cortex and acentrically displaced  hilum       Left breast 12:00 3 significant from the nipple there is a 7 x 3 mm cyst  corresponding to the mass mammographic asymmetry.     The findings in the right breast for noted to be highly suspicious (BIRADS 5)  and a 3 site biopsy was recommended.     She also notes recent inversion of the right nipple as well.     Final diagnosis     A. Right axillary lymph node, ultrasound-guided core biopsy: Metastatic carcinoma.  Comment: Tumor deposit measure 7 mm microscopically     B. right breast mass, 11 o'clock, ultrasound-guided core biopsy: Invasive ductal carcinoma, provisional grade 2-3  ER 95% IA 95% HER-2 by IHC equivocal, negative by dual JANY  Comment: tumor measure 1.6cm microscopically     C. right breast mass, 2:00, ultrasound guided core biopsy: Invasive ductal carcinoma, provisional grade 2-3  comment: Tumor measures 2.2 cm microscopically.  ER 95% IA 95% HER-2 negative     Final diagnosis  Right breast skin, skin punch biopsy: Fragments of skin with focal microcalcification negative for malignancy.     Staging work up negative for distant metastatic disease.     Completed neoadjuvant chemotherapy and was scheduled for a right modified radical mastectomy in March when she unfortunately experienced an acute aortic dissection necessitating emergent intervention with endovascular stenting by Dr. Arizmendi. After surgery, she had profound lower extremity weakness and was discharged from the hospital to inpatient rehab. Her case was discussed at our multi disciplinary tumor board and it was recommended she be placed on endocrine therapy while recovering from her aortic procedure.      Presented in May for a regroup. She had been maintained on endocrine therapy since March.      Follow up mammogram and U/S were performed 5/7/2020 and showed a decrease in both breast masses compared to post neoadjuvant imaging.  Right 2:00 8cmFN measures 1.7 x 1.5 x 1.0cm (previously 2.1 x 1.2 x 2cm)  Right 11:00 6cmFN measures 0.7 x 0.6  x 0.3cm (1.6 x 1.3 x 0.7cm)     Breast is no longer edematous, nipple is everted, no redness of the breast.      8/12/2020 right modified radical mastectomy with wire localized axillary node dissection. 2 foci of residual carcinoma measuring 1mm and 2.3mm with significant therapy effect (90% and 95% within tumor beds). 3/8 lymph nodes positive. Margins negative.     Had some wound healing issues with mastectomy incision. Managed in the wound healing center.      PMRT with Dr. Bella- completed on 12/21/2020.     Doing well since radiation. No further wound issues.     Fell out of the car on 4/1/2021 and broke her right ankle. Non operative management.     Last seen by me in 2021.    Had a PET CT ordered by her oncologist with some vague, low FDG avid areas along the chest wall scar.  Presents today for further evaluation of me.    Targeted U/S of the chest wall with a cystic lesion along the lateral aspect of the scar favoring post op changes.  Chloe has inquired if additional surgery is feasible to remove this area seen on PET and smooth the contour of the chest wall.    3/1/2024 right chest wall excision- benign scarring and post surgical changes.  No evidence of recurrence.      Review of Systems   Constitutional symptoms: Denies generalized fatigue.  Denies weight change, fevers/chills, difficulty sleeping   Eyes: Denies double vision, glaucoma, cataracts.  Ear/nose/throat/mouth: Denies hearing changes, sore throat, sinus problems.  Cardiovascular: No chest pain.  Denies irregular heartbeat.  Denies ankle swelling.  Respiratory: No wheezing, cough, or shortness of breath.  Gastrointestinal: No abdominal pain,  No nausea/vomiting.  No indigestion/heartburn.  No change in bowel habits.  No constipation or diarrhea.   Genitourinary: No urinary incontinence.  No urinary frequency.  No painful urination.  Musculoskeletal: No bone pain, no muscle pain, no joint pain.   Integumentary: No rash. No masses.  No changes in  moles.  No easy bruising.  Neurological: No headaches.  No tremors. No numbness/tingling.  Psychiatric: No anxiety. No depression.  Endocrine: No excessive thirst.  Not too hot or too cold.  Not tired or fatigued.    Hematological/lymphatic: No swollen glands or blood clotting problems.  No bruising.    Objective   Physical Exam  General: Alert and oriented x 3.  Mood and affect are appropriate.  HEENT: EOMI, PERRLA.   Neck: supple, no masses, no cervical adenopathy.  Cardiovascular: no lower extremity edema.  Pulmonary: breathing non labored on room air.  GI: Abdomen soft, no masses. No hepatomegaly or splenomegaly.  Lymph nodes: No supraclavicular or axillary adenopathy bilaterally.  Musculoskeletal: Full range of motion in the upper extremities bilaterally.  Neuro: denies dizziness, tremors    Breasts: The breasts were examined in both the seated and supine positions.    RIGHT: surgically absent.  Incision is healing well.  Prolene sutures removed at this visit.    LEFT: The nipple is everted without nipple discharge.  There are no skin changes, skin thickening, or dimpling. There are no masses palpated in the LEFT breast.       Radiology review: All images and reports were personally reviewed.         Rosaura Flores DO

## 2024-03-18 ENCOUNTER — OFFICE VISIT (OUTPATIENT)
Dept: SURGICAL ONCOLOGY | Facility: HOSPITAL | Age: 64
End: 2024-03-18
Payer: COMMERCIAL

## 2024-03-18 VITALS — WEIGHT: 173 LBS | HEIGHT: 63 IN | BODY MASS INDEX: 30.65 KG/M2

## 2024-03-18 DIAGNOSIS — Z12.31 ENCOUNTER FOR SCREENING MAMMOGRAM FOR MALIGNANT NEOPLASM OF BREAST: ICD-10-CM

## 2024-03-18 DIAGNOSIS — Z85.3 PERSONAL HISTORY OF MALIGNANT NEOPLASM OF BREAST: ICD-10-CM

## 2024-03-18 DIAGNOSIS — N63.11 MASS OF UPPER OUTER QUADRANT OF RIGHT BREAST: Primary | ICD-10-CM

## 2024-03-18 PROCEDURE — 4004F PT TOBACCO SCREEN RCVD TLK: CPT | Performed by: SURGERY

## 2024-03-18 PROCEDURE — 99024 POSTOP FOLLOW-UP VISIT: CPT | Performed by: SURGERY

## 2024-07-29 LAB
NON-UH HIE A/G RATIO: 1.1
NON-UH HIE ALB: 3.6 G/DL (ref 3.4–5)
NON-UH HIE ALK PHOS: 110 UNIT/L (ref 45–117)
NON-UH HIE BASO COUNT: 0.16 X1000 (ref 0–0.2)
NON-UH HIE BASOS %: 2.1 %
NON-UH HIE BILIRUBIN, TOTAL: 0.3 MG/DL (ref 0.3–1.2)
NON-UH HIE BUN/CREAT RATIO: 13.8
NON-UH HIE BUN: 11 MG/DL (ref 9–23)
NON-UH HIE CA 27.29 TM: 16.1 U/ML (ref 0–38.6)
NON-UH HIE CALCIUM: 9.2 MG/DL (ref 8.7–10.4)
NON-UH HIE CALCULATED OSMOLALITY: 280 MOSM/KG (ref 275–295)
NON-UH HIE CEA: 4.2 NG/ML (ref 0–2.5)
NON-UH HIE CHLORIDE: 109 MMOL/L (ref 98–107)
NON-UH HIE CO2, VENOUS: 26 MMOL/L (ref 20–31)
NON-UH HIE CREATININE: 0.8 MG/DL (ref 0.5–0.8)
NON-UH HIE DIFF?: NO
NON-UH HIE EOS COUNT: 0.16 X1000 (ref 0–0.5)
NON-UH HIE EOSIN %: 2.1 %
NON-UH HIE GFR AA: >60
NON-UH HIE GLOBULIN: 3.2 G/DL
NON-UH HIE GLOMERULAR FILTRATION RATE: >60 ML/MIN/1.73M?
NON-UH HIE GLUCOSE: 91 MG/DL (ref 74–106)
NON-UH HIE GOT: 18 UNIT/L (ref 15–37)
NON-UH HIE GPT: 25 UNIT/L (ref 10–49)
NON-UH HIE HCT: 45.2 % (ref 36–46)
NON-UH HIE HGB: 15 G/DL (ref 12–16)
NON-UH HIE INSTR WBC: 7.7
NON-UH HIE K: 4.1 MMOL/L (ref 3.5–5.1)
NON-UH HIE LYMPH %: 18.9 %
NON-UH HIE LYMPH COUNT: 1.45 X1000 (ref 1.2–4.8)
NON-UH HIE MCH: 31.7 PG (ref 27–34)
NON-UH HIE MCHC: 33.2 G/DL (ref 32–37)
NON-UH HIE MCV: 95.7 FL (ref 80–100)
NON-UH HIE MONO %: 8.2 %
NON-UH HIE MONO COUNT: 0.63 X1000 (ref 0.1–1)
NON-UH HIE MPV: 8.7 FL (ref 7.4–10.4)
NON-UH HIE NA: 141 MMOL/L (ref 135–145)
NON-UH HIE NEUTROPHIL %: 68.7 %
NON-UH HIE NEUTROPHIL COUNT (ANC): 5.29 X1000 (ref 1.4–8.8)
NON-UH HIE NUCLEATED RBC: 0 /100WBC
NON-UH HIE PLATELET: 206 X10 (ref 150–450)
NON-UH HIE RBC: 4.72 X10 (ref 4.2–5.4)
NON-UH HIE RDW: 14 % (ref 11.5–14.5)
NON-UH HIE TOTAL PROTEIN: 6.8 G/DL (ref 5.7–8.2)
NON-UH HIE WBC: 7.7 X10 (ref 4.5–11)

## 2024-11-07 ENCOUNTER — OFFICE VISIT (OUTPATIENT)
Dept: PRIMARY CARE | Facility: CLINIC | Age: 64
End: 2024-11-07
Payer: COMMERCIAL

## 2024-11-07 VITALS
WEIGHT: 178 LBS | HEIGHT: 63 IN | DIASTOLIC BLOOD PRESSURE: 82 MMHG | OXYGEN SATURATION: 97 % | HEART RATE: 87 BPM | SYSTOLIC BLOOD PRESSURE: 138 MMHG | BODY MASS INDEX: 31.54 KG/M2

## 2024-11-07 DIAGNOSIS — I74.5 ILIAC ARTERY OCCLUSION (MULTI): ICD-10-CM

## 2024-11-07 DIAGNOSIS — E03.9 HYPOTHYROIDISM, UNSPECIFIED TYPE: ICD-10-CM

## 2024-11-07 DIAGNOSIS — M81.0 OSTEOPOROSIS, UNSPECIFIED OSTEOPOROSIS TYPE, UNSPECIFIED PATHOLOGICAL FRACTURE PRESENCE: ICD-10-CM

## 2024-11-07 DIAGNOSIS — Z95.820 S/P PERIPHERAL ARTERY ANGIOPLASTY WITH STENT PLACEMENT: ICD-10-CM

## 2024-11-07 DIAGNOSIS — J44.9 OBSTRUCTIVE LUNG DISEASE (GENERALIZED) (MULTI): ICD-10-CM

## 2024-11-07 DIAGNOSIS — F17.200 TOBACCO USE DISORDER: Primary | ICD-10-CM

## 2024-11-07 DIAGNOSIS — Z86.79 HISTORY OF AORTIC DISSECTION: ICD-10-CM

## 2024-11-07 DIAGNOSIS — Z23 ENCOUNTER FOR IMMUNIZATION: ICD-10-CM

## 2024-11-07 DIAGNOSIS — R06.02 SHORTNESS OF BREATH: ICD-10-CM

## 2024-11-07 PROCEDURE — 94010 BREATHING CAPACITY TEST: CPT | Performed by: STUDENT IN AN ORGANIZED HEALTH CARE EDUCATION/TRAINING PROGRAM

## 2024-11-07 PROCEDURE — 90673 RIV3 VACCINE NO PRESERV IM: CPT | Performed by: STUDENT IN AN ORGANIZED HEALTH CARE EDUCATION/TRAINING PROGRAM

## 2024-11-07 PROCEDURE — 90471 IMMUNIZATION ADMIN: CPT | Performed by: STUDENT IN AN ORGANIZED HEALTH CARE EDUCATION/TRAINING PROGRAM

## 2024-11-07 PROCEDURE — 99406 BEHAV CHNG SMOKING 3-10 MIN: CPT | Performed by: STUDENT IN AN ORGANIZED HEALTH CARE EDUCATION/TRAINING PROGRAM

## 2024-11-07 PROCEDURE — 3008F BODY MASS INDEX DOCD: CPT | Performed by: STUDENT IN AN ORGANIZED HEALTH CARE EDUCATION/TRAINING PROGRAM

## 2024-11-07 PROCEDURE — 90677 PCV20 VACCINE IM: CPT | Performed by: STUDENT IN AN ORGANIZED HEALTH CARE EDUCATION/TRAINING PROGRAM

## 2024-11-07 PROCEDURE — 4004F PT TOBACCO SCREEN RCVD TLK: CPT | Performed by: STUDENT IN AN ORGANIZED HEALTH CARE EDUCATION/TRAINING PROGRAM

## 2024-11-07 PROCEDURE — 99215 OFFICE O/P EST HI 40 MIN: CPT | Performed by: STUDENT IN AN ORGANIZED HEALTH CARE EDUCATION/TRAINING PROGRAM

## 2024-11-07 PROCEDURE — 90472 IMMUNIZATION ADMIN EACH ADD: CPT | Performed by: STUDENT IN AN ORGANIZED HEALTH CARE EDUCATION/TRAINING PROGRAM

## 2024-11-07 RX ORDER — MICONAZOLE NITRATE 2 %
2 CREAM (GRAM) TOPICAL AS NEEDED
Qty: 100 EACH | Refills: 0 | Status: SHIPPED | OUTPATIENT
Start: 2024-12-31 | End: 2025-01-30

## 2024-11-07 RX ORDER — FLUTICASONE FUROATE, UMECLIDINIUM BROMIDE AND VILANTEROL TRIFENATATE 100; 62.5; 25 UG/1; UG/1; UG/1
1 POWDER RESPIRATORY (INHALATION) DAILY
Qty: 28 EACH | Refills: 2 | Status: SHIPPED | OUTPATIENT
Start: 2024-11-07

## 2024-11-07 RX ORDER — NICOTINE 7MG/24HR
1 PATCH, TRANSDERMAL 24 HOURS TRANSDERMAL EVERY 24 HOURS
Qty: 14 PATCH | Refills: 0 | Status: SHIPPED | OUTPATIENT
Start: 2024-12-31 | End: 2025-01-14

## 2024-11-07 RX ORDER — ALBUTEROL SULFATE 90 UG/1
2 INHALANT RESPIRATORY (INHALATION) EVERY 4 HOURS PRN
Qty: 8.5 G | Refills: 5 | Status: SHIPPED | OUTPATIENT
Start: 2024-11-07 | End: 2025-11-07

## 2024-11-07 RX ORDER — BUPROPION HYDROCHLORIDE 150 MG/1
TABLET, EXTENDED RELEASE ORAL
Qty: 57 TABLET | Refills: 0 | Status: SHIPPED | OUTPATIENT
Start: 2024-12-25 | End: 2025-01-24

## 2024-11-07 NOTE — PROGRESS NOTES
Subjective   Patient ID: Yecenia Porter is a 63 y.o. female who presents for the following    Assessment/Plan   Preventative Medicine (July 2024 with Dr Islas)   -Not UTD on vaccines.   -Flu and PNA shot to be October 2024  -MMG to be done by Oncology. Currently in remission from breast ca (R s/p mastectomy).   -Colon Cancer screening done in 2023. States that it was okay. Was done by Dr Fletcher. Will get records.   -Lung Cancer Screen: has CT chest ordered by Heme/Onc.   -PHQ2: 0; controlled on effexor   -Alcohol screen negative       Breast Ca   -Dx in 2019  -s/p mastectomy in 2021   -currently in clinical remission  -Currently on arimidex only  -Follows with Dr Arciniega at [facility]     Hypothyroidism  -TSH > 6 in July 2024  -Currently on synthroid 50mcg  -Check TSH today     Osteoporosis  -DEXA in Oct 2023 confirmed  -Continue fosfomax  -Check Vitamin D and Calcium levels today     HLD  -Lipids okay in July 2024  -Continue Lipitor 10mg PO daily     Elevated BP without Dx of HTN   -Previously had normal BP readings. Could be a component of [facility].  -Asymptomatic  PLAN  -Ambulatory monitoring   -Decrease salt intake  -Improve diet  -Start increasing exercise   -Follow up 1 month. If BP is still high will start norvasc 5mg PO daily.     SOB  -Office spirometry shows severe obstruction  -Has CT Chest ordered by Heme/Onc  PLAN  -Order formal PFTs  -Albuterol PRN rx  -Trelegy Rx given   -Smoking cessation advice    Tobacco Use Disorder  -She is interested in quitting.  Has cut down tobacco use significantly.  - Discussed Chantix versus Wellbutrin.  - Patient would like to try Wellbutrin.  Instructions given to start 1 week prior to quit date of 12/31/2024.  - Nicotine replacement therapy ordered  - Approximately 6-minute spent on tobacco counseling      HPI  63-year-old female presents for annual physical exam, to establish care.  She is referred from her oncologist.  She has a past medical history of breast CA s/p  mastectomy, MDD, BENTLEY, history of abdominal aorta stenting, history of thromboembolectomy, hyperlipidemia, hypothyroidism.    In regard to breast cancer currently she is in remission and is being treated with Arimidex.  Oncology is following her for routine reoccurrence screening.    She was started on Fosamax in 2023 for treatment of osteoporosis.  At that time vitamin D levels were low at 21.  Currently she is only taking daily vitamin D supplements.    Discussed chronic medical conditions such as hypothyroidism, hyperlipidemia.    Patient also has a history of shortness of breath without any formal diagnosis of COPD.  Office spirometry was done which shows severe obstruction.  Formal PFTs to be ordered and patient will be started on albuterol, Trelegy.  Smoking cessation advice given.    In terms of general health patient is doing okay.  Tries eat healthy and stays active.    ROS negative unless mentioned above    Denies any unintentional weight loss, fevers, chills, chest pain, cough, nausea, vomiting, diarrhea.  PMH: osteoporosis, breast ca, HLD, hypothyroidism, MDD/BENTLEY, abdominal aorta stenting, abdominal aorta/iliac thromboembolectomy  Surgeries: See below    Family Hx  -Maternal: unknown  -Paternal: DM in father and sister. CAD in father   -Cancer: colon cancer in father, sister with breast cancer     Social Hx  T: down to 2 cigarettes daily. Most she ever smoked was 1ppd. Started smoking at age 16. About 46 py smoking hx  A: denies  D: denies     Personal Hx  -/  -  -2 children        Past Medical History:   Diagnosis Date    Anxiety     Mcintyre's esophagus     Breast cancer (Multi)     Cervical disc disease     Depression     Easy bruising     Hx antineoplastic chemo     Hyperlipidemia     Hypothyroidism     Lumbar disc disease     Personal history of irradiation     Personal history of other mental and behavioral disorders     History of anxiety    Spinal stenosis  "       Past Surgical History:   Procedure Laterality Date    BACK SURGERY      Cervical and lumbar (pt does not know what was done).     SECTION, LOW TRANSVERSE      COLONOSCOPY      MASTECTOMY Right 2020    ORIF WRIST FRACTURE      OTHER SURGICAL HISTORY      Abdominal aortic stent graft/tube graft for aortic dissection    OTHER SURGICAL HISTORY      Abdominal aortic and bilateral iliac thromboembolectomy    OTHER SURGICAL HISTORY      Right iliac covered stent; left iliac artery covered stent; right common femoral thrombectomy with patch; repair right common femoral artery dissection; repair blood vessel right femoral    UPPER GASTROINTESTINAL ENDOSCOPY         Family History   Problem Relation Name Age of Onset    Other (HTN) Mother      Other (HLD) Mother      Diabetes Father      Heart disease Father      Brain Aneurysm Father      Colon cancer Father      Diabetes Sister      Breast cancer Sister      No Known Problems Maternal Grandmother      No Known Problems Maternal Grandfather      No Known Problems Paternal Grandmother      Diabetes Paternal Grandfather         Social Drivers of Health     Tobacco Use: High Risk (2024)    Patient History     Smoking Tobacco Use: Every Day     Smokeless Tobacco Use: Never     Passive Exposure: Past   Alcohol Use: Not on file   Financial Resource Strain: Not on file   Food Insecurity: Not on file   Transportation Needs: Not on file   Physical Activity: Not on file   Stress: Not on file   Social Connections: Not on file   Intimate Partner Violence: Not on file   Depression: Not on file   Housing Stability: Not on file   Utilities: Not on file   Digital Equity: Not on file   Health Literacy: Not on file         Visit Vitals  /82   Pulse 87   Ht 1.6 m (5' 3\")   Wt 80.7 kg (178 lb)   SpO2 97%   BMI 31.53 kg/m²   Smoking Status Every Day   BSA 1.89 m²     PHYSICAL EXAM   Physical Exam       General: NAD. NCAT. Aox3   HEENT: PERRLA. EOMI. MMM. Nares patent " bl.  Cardiovascular: RRR. No MRG. S1/S2 wnl.   Respiratory: BL scattered rhonchi. No acute respiratory distress.   GI: Soft, NT abdomen. BS present x 4.   MSK: ROM x 4. CTLS non-tender.   Extremities: No edema. Cap refill < 2 sec.   Skin: No rashes or bruises. S/p mastectomy for breast ca.   Neuro: Aox3. Cranial Nerves grossly intact. Motor/sensory wnl.   Psych: Mood wnl.      REVIEW OF SYSTEMS       Allergies   Allergen Reactions    Prozac [Fluoxetine] Agitation    Gabapentin Other       Current Outpatient Medications   Medication Sig Dispense Refill    alendronate (Fosamax) 70 mg tablet See Instructions, TAKE 1 TABLET BY MOUTH EVERY MONDAY 30 MINUTES BEFORE THE AM MEAL WITH WATER. STAY UPRIGHT FOR 30-60 MINUTES AFTER TAKING, 12 tabs, , [id number], Instructions Replace Required Details, TAKE 1 TABLET BY MOUTH EVERY...      anastrozole (Arimidex) 1 mg tablet 1 tablet (1 mg total) once daily at bedtime.      atorvastatin (Lipitor) 10 mg tablet Take 1 tablet (10 mg) by mouth once daily.      calcium carbonate (Oyster Shell Calcium 500) 500 mg calcium (1,250 mg) tablet Take 1 tablet (1,250 mg) by mouth once daily.      Eliquis 5 mg tablet Take 1 tablet (5 mg) by mouth 2 times a day.      levothyroxine (Synthroid, Levoxyl) 50 mcg tablet Take 1 tablet (50 mcg) by mouth once daily in the morning. Take before meals.      venlafaxine (Effexor) 75 mg tablet Take 1 tablet (75 mg) by mouth once daily.      venlafaxine 150 mg 24 hr tablet 1 tablet (150 mg) once daily.       No current facility-administered medications for this visit.       Objective     Orders Only on 07/29/2024   Component Date Value Ref Range Status    [facility] Nucleated RBC 07/29/2024 0  /100WBC Final    [facility] WBC 07/29/2024 7.7  4.5 - 11.0 x10 Final    [facility] MCH 07/29/2024 31.7  27.0 - 34.0 pg Final    [facility] HCT 07/29/2024 45.2  36.0 - 46.0 % Final    [facility] RDW 07/29/2024 14.0  11.5 - 14.5 % Final    [facility] RBC  07/29/2024 4.72  4.20 - 5.40 x10 Final    [facility] MPV 07/29/2024 8.7  7.4 - 10.4 fL Final    [facility] MCV 07/29/2024 95.7  80.0 - 100.0 fL Final    [facility] Instr WBC 07/29/2024 7.7   Final    [facility] MCHC 07/29/2024 33.2  32.0 - 37.0 g/dL Final    [facility] Platelet 07/29/2024 206  150 - 450 x10 Final    [facility] HGB 07/29/2024 15.0  12.0 - 16.0 g/dL Final    [facility] DIFF? 07/29/2024 No   Final    [facility] Eos Count 07/29/2024 0.16  0.00 - 0.50 x1000 Final    [facility] Lymph Count 07/29/2024 1.45  1.20 - 4.80 x1000 Final    [facility] Lymph % 07/29/2024 18.9  % Final    [facility] Baso Count 07/29/2024 0.16  0.00 - 0.20 x1000 Final    [facility] Basos % 07/29/2024 2.1  % Final    [facility] Mono % 07/29/2024 8.2  % Final    [facility] Mono Count 07/29/2024 0.63  0.10 - 1.00 x1000 Final    [facility] Neutrophil % 07/29/2024 68.7  % Final    [facility] Neutrophil Count (ANC) 07/29/2024 5.29  1.40 - 8.80 x1000 Final    [facility] Eosin % 07/29/2024 2.1  % Final    [facility] Glomerular Filtration R* 07/29/2024 >60  mL/min/1.73m? Final    [facility] Calculated Osmolality 07/29/2024 280  275 - 295 mOsm/kg Final    [facility] K 07/29/2024 4.1  3.5 - 5.1 mmol/L Final    [facility] Globulin 07/29/2024 3.2  g/dL Final    [facility] BUN 07/29/2024 11  9 - 23 mg/dL Final    [facility] Calcium 07/29/2024 9.2  8.7 - 10.4 mg/dL Final    [facility] GOT 07/29/2024 18  15 - 37 unit/L Final    [facility] ALB 07/29/2024 3.6  3.4 - 5.0 g/dL Final    [facility] Glucose 07/29/2024 91  74 - 106 mg/dL Final    [facility] GFR AA 07/29/2024 >60   Final    [facility] Bilirubin, Total 07/29/2024 0.30  0.30 - 1.20 mg/dL Final    [facility] Chloride 07/29/2024 109 (H)  98 - 107 mmol/L Final    [facility] A/G Ratio 07/29/2024 1.1   Final    [facility] BUN/Creat Ratio 07/29/2024 13.8   Final    [facility] Na 07/29/2024 141  135 - 145 mmol/L Final    [facility] GPT 07/29/2024 25  10 - 49 unit/L Final    [facility] Alk Phos  2024 110  45 - 117 unit/L Final    [facility] Creatinine 2024 0.8  0.5 - 0.8 mg/dL Final    [facility] Total Protein 2024 6.8  5.7 - 8.2 g/dL Final    [facility] CO2, venous 2024 26.0  20.0 - 31.0 mmol/L Final    [facility] CEA 2024 4.2 (H)  0.0 - 2.5 ng/mL Final    [facility] CA 27.29 TM 2024 16.1  0.0 - 38.6 U/mL Final       Radiology: Reviewed imaging in powerchart.  No results found.    Family History   Problem Relation Name Age of Onset    Other (HTN) Mother      Other (HLD) Mother      Diabetes Father      Heart disease Father      Brain Aneurysm Father      Colon cancer Father      Diabetes Sister      Breast cancer Sister      No Known Problems Maternal Grandmother      No Known Problems Maternal Grandfather      No Known Problems Paternal Grandmother      Diabetes Paternal Grandfather       Social History     Socioeconomic History    Marital status:    Tobacco Use    Smoking status: Every Day     Current packs/day: 0.25     Average packs/day: 0.3 packs/day for 48.8 years (12.2 ttl pk-yrs)     Types: Cigarettes     Start date: 1976     Passive exposure: Past    Smokeless tobacco: Never   Vaping Use    Vaping status: Never Used   Substance and Sexual Activity    Alcohol use: Never    Drug use: Never    Sexual activity: Not Currently     Partners: Male     Birth control/protection: None     Past Medical History:   Diagnosis Date    Anxiety     Mcintyre's esophagus     Breast cancer (Multi)     Cervical disc disease     Depression     Easy bruising     Hx antineoplastic chemo     Hyperlipidemia     Hypothyroidism     Lumbar disc disease     Personal history of irradiation     Personal history of other mental and behavioral disorders     History of anxiety    Spinal stenosis      Past Surgical History:   Procedure Laterality Date    BACK SURGERY      Cervical and lumbar (pt does not know what was done).     SECTION, LOW TRANSVERSE      COLONOSCOPY       MASTECTOMY Right 2020    ORIF WRIST FRACTURE      OTHER SURGICAL HISTORY      Abdominal aortic stent graft/tube graft for aortic dissection    OTHER SURGICAL HISTORY      Abdominal aortic and bilateral iliac thromboembolectomy    OTHER SURGICAL HISTORY      Right iliac covered stent; left iliac artery covered stent; right common femoral thrombectomy with patch; repair right common femoral artery dissection; repair blood vessel right femoral    UPPER GASTROINTESTINAL ENDOSCOPY         Charting was completed using voice recognition technology and may include unintended errors.

## 2024-11-18 ENCOUNTER — LAB (OUTPATIENT)
Dept: LAB | Facility: LAB | Age: 64
End: 2024-11-18
Payer: COMMERCIAL

## 2024-11-18 DIAGNOSIS — M81.0 OSTEOPOROSIS, UNSPECIFIED OSTEOPOROSIS TYPE, UNSPECIFIED PATHOLOGICAL FRACTURE PRESENCE: ICD-10-CM

## 2024-11-18 DIAGNOSIS — E03.9 HYPOTHYROIDISM, UNSPECIFIED TYPE: ICD-10-CM

## 2024-11-18 LAB
25(OH)D3 SERPL-MCNC: 25 NG/ML (ref 30–100)
CALCIUM SERPL-MCNC: 9.5 MG/DL (ref 8.6–10.6)
TSH SERPL-ACNC: 2.46 MIU/L (ref 0.44–3.98)

## 2024-11-18 PROCEDURE — 36415 COLL VENOUS BLD VENIPUNCTURE: CPT

## 2024-11-18 PROCEDURE — 84443 ASSAY THYROID STIM HORMONE: CPT

## 2024-11-18 PROCEDURE — 82310 ASSAY OF CALCIUM: CPT

## 2024-11-18 PROCEDURE — 82306 VITAMIN D 25 HYDROXY: CPT

## 2024-11-19 ENCOUNTER — TELEPHONE (OUTPATIENT)
Dept: PRIMARY CARE | Facility: CLINIC | Age: 64
End: 2024-11-19
Payer: COMMERCIAL

## 2024-11-19 NOTE — TELEPHONE ENCOUNTER
----- Message from Sveta Mcknight sent at 11/19/2024  2:08 PM EST -----  Recommend OTC vitamin D daily. Will recheck in 3 months.

## 2024-11-27 DIAGNOSIS — F32.1 MAJOR DEPRESSIVE DISORDER, SINGLE EPISODE, MODERATE (MULTI): ICD-10-CM

## 2024-12-01 RX ORDER — VENLAFAXINE 75 MG/1
75 TABLET ORAL DAILY
Qty: 30 TABLET | Refills: 3 | OUTPATIENT
Start: 2024-12-01

## 2024-12-02 RX ORDER — VENLAFAXINE 75 MG/1
75 TABLET ORAL DAILY
Qty: 90 TABLET | Refills: 1 | Status: SHIPPED | OUTPATIENT
Start: 2024-12-02

## 2024-12-03 ENCOUNTER — TELEPHONE (OUTPATIENT)
Dept: PRIMARY CARE | Facility: CLINIC | Age: 64
End: 2024-12-03
Payer: COMMERCIAL

## 2024-12-16 ENCOUNTER — APPOINTMENT (OUTPATIENT)
Dept: SURGICAL ONCOLOGY | Facility: HOSPITAL | Age: 64
End: 2024-12-16
Payer: COMMERCIAL

## 2025-01-06 ENCOUNTER — APPOINTMENT (OUTPATIENT)
Dept: SURGICAL ONCOLOGY | Facility: HOSPITAL | Age: 65
End: 2025-01-06
Payer: COMMERCIAL

## 2025-01-06 ENCOUNTER — APPOINTMENT (OUTPATIENT)
Dept: RADIOLOGY | Facility: HOSPITAL | Age: 65
End: 2025-01-06
Payer: COMMERCIAL

## 2025-01-09 ENCOUNTER — TELEPHONE (OUTPATIENT)
Dept: PRIMARY CARE | Facility: CLINIC | Age: 65
End: 2025-01-09
Payer: COMMERCIAL

## 2025-01-21 ENCOUNTER — APPOINTMENT (OUTPATIENT)
Dept: PRIMARY CARE | Facility: CLINIC | Age: 65
End: 2025-01-21
Payer: COMMERCIAL

## 2025-01-21 VITALS
BODY MASS INDEX: 32.07 KG/M2 | DIASTOLIC BLOOD PRESSURE: 84 MMHG | OXYGEN SATURATION: 95 % | SYSTOLIC BLOOD PRESSURE: 134 MMHG | HEART RATE: 82 BPM | HEIGHT: 63 IN | WEIGHT: 181 LBS

## 2025-01-21 DIAGNOSIS — R07.9 CHEST PAIN, UNSPECIFIED TYPE: ICD-10-CM

## 2025-01-21 DIAGNOSIS — Z78.9 TRANSITION OF CARE: Primary | ICD-10-CM

## 2025-01-21 PROCEDURE — 3008F BODY MASS INDEX DOCD: CPT | Performed by: STUDENT IN AN ORGANIZED HEALTH CARE EDUCATION/TRAINING PROGRAM

## 2025-01-21 PROCEDURE — 99495 TRANSJ CARE MGMT MOD F2F 14D: CPT | Performed by: STUDENT IN AN ORGANIZED HEALTH CARE EDUCATION/TRAINING PROGRAM

## 2025-01-21 NOTE — PROGRESS NOTES
Subjective   Patient ID: Yecenia Porter is a 64 y.o. female who presents for the following    Assessment/Plan   Preventative Medicine (July 2024 with Dr Islas)   -Not UTD on vaccines.   -Flu and PNA shot to be October 2024  -MMG to be done by Oncology. Currently in remission from breast ca (R s/p mastectomy).   -Colon Cancer screening done in 2023. States that it was okay. Was done by Dr Fletcher. Will get records.   -Lung Cancer Screen: CT w/ IV contrast 11/18/24 no e/o nodules or mass  -PHQ2: 0; controlled on effexor   -Alcohol screen negative     #TCM  #Chest pain  -Previously hospitalized for chest pain, EKG w/o ischemic changes with negative troponins, suspect symptoms c/b non-cardiac in cause however d/t underling risk factors will pursue further work-up to r/o cardiac cause  Plan:  -Nuclear stress test ordered  -Continue statin     #Breast Ca   -Dx in 2019  -s/p mastectomy in 2021   -currently in clinical remission  -Currently on arimidex only  -Follows with Dr Arciniega at [facility]      #Hypothyroidism  -TSH normal in Nov 2024  -Currently on synthroid 50mcg     #Osteoporosis  -DEXA in Oct 2023 confirmed  -Ca 9.5, Vit d 25 (11/24)  Plan:  -Continue fosfomax  -Continue vit d supplementation  -Repeat DEXA this year (2025). Check vitamin D/Calcium prior.      #HLD  -Lipids okay in July 2024  -Continue Lipitor 10mg PO daily      #Elevated BP without Dx of HTN   -Previously had normal BP readings. Could be a component of [facility].  -Asymptomatic  PLAN  -Ambulatory monitoring   -Decrease salt intake  -Improve diet, advised on exercise  -Continue to monitor      #SOB, resolved  -Office spirometry shows severe obstruction  -PFTs reviewed and show restrictive defect  PLAN  -Continue albuterol PRN and Trelegy  -Smoking cessation advice   -Discussed repeating PFT due to using trelegy morning of PFT or follow up with pulm. Declining both at this time.       Tobacco Use Disorder  -She is interested in quitting.  Has cut  down tobacco use significantly.  - Discussed Chantix versus Wellbutrin.  - Currently taking Wellbutrin, still trying to wean off   - Reports Nicotine replacement therapy did not help  - Approximately 6-minute spent on tobacco counseling    HPI  63 yo F presents for transition of care appointment he was recently admitted for chest pain, described as chest tightness rating to left arm, in the ED, EKG was nonischemic with normal serial troponins. Seen by cardiology, no acute intervention. Plan for stress test as an outpatient.    At bedside, she report chest pain has resolved, no further episodes previous hospitalizations. Otherwise, reports doing well, is trying to wean of cigarettes, she did start wellbutrin does endorse some improvement with cravings.     Denies any unintentional weight loss, fevers, chills, chest pain, cough, nausea, vomiting, diarrhea.    PMH: osteoporosis, breast ca, HLD, hypothyroidism, MDD/BENTLEY, abdominal aorta stenting, abdominal aorta/iliac thromboembolectomy  Surgeries: See below     Family Hx  -Maternal: unknown  -Paternal: DM in father and sister. CAD in father   -Cancer: colon cancer in father, sister with breast cancer      Social Hx  T: down to 1-2 cigarettes daily. Most she ever smoked was 1ppd. Started smoking at age 16. About 46 py smoking hx  A: denies  D: denies      Personal Hx  -/  -  -2 children    Visit Vitals  Smoking Status Every Day     PHYSICAL EXAM   General: NAD. NCAT. Aox3   HEENT: PERRLA. EOMI. MMM. Nares patent bl.  Cardiovascular: RRR. No MRG. S1/S2 wnl.   Respiratory: BL scattered rhonchi. No acute respiratory distress.   GI: Soft, NT abdomen. BS present x 4.   MSK: ROM x 4. CTLS non-tender.   Extremities: No edema. Cap refill < 2 sec.   Skin: No rashes or bruises. S/p mastectomy for breast ca.   Neuro: Aox3. Cranial Nerves grossly intact. Motor/sensory wnl.   Psych: Mood wnl.      REVIEW OF SYSTEMS   ROS: 12 systems reviewed  and negative except per HPI above    Allergies   Allergen Reactions    Prozac [Fluoxetine] Agitation    Gabapentin Other       Current Outpatient Medications   Medication Sig Dispense Refill    albuterol (ProAir HFA) 90 mcg/actuation inhaler Inhale 2 puffs every 4 hours if needed for wheezing or shortness of breath. 8.5 g 5    alendronate (Fosamax) 70 mg tablet See Instructions, TAKE 1 TABLET BY MOUTH EVERY MONDAY 30 MINUTES BEFORE THE AM MEAL WITH WATER. STAY UPRIGHT FOR 30-60 MINUTES AFTER TAKING, 12 tabs, , [id number], Instructions Replace Required Details, TAKE 1 TABLET BY MOUTH EVERY...      anastrozole (Arimidex) 1 mg tablet 1 tablet (1 mg total) once daily at bedtime.      atorvastatin (Lipitor) 10 mg tablet Take 1 tablet (10 mg) by mouth once daily.      buPROPion SR (Wellbutrin SR) 150 mg 12 hr tablet Take 1 tablet (150 mg) by mouth once daily for 3 days, THEN 1 tablet (150 mg) 2 times a day for 27 days. Do not crush, chew, or split.. Do not fill before December 25, 2024. 57 tablet 0    calcium carbonate (Oyster Shell Calcium 500) 500 mg calcium (1,250 mg) tablet Take 1 tablet (1,250 mg) by mouth once daily.      Eliquis 5 mg tablet Take 1 tablet (5 mg) by mouth 2 times a day.      fluticasone-umeclidin-vilanter (Trelegy Ellipta) 100-62.5-25 mcg blister with device Inhale 1 puff once daily. 28 each 2    levothyroxine (Synthroid, Levoxyl) 50 mcg tablet Take 1 tablet (50 mcg) by mouth once daily in the morning. Take before meals.      nicotine (Nicoderm CQ) 7 mg/24 hr patch Place 1 patch over 24 hours on the skin once every 24 hours for 14 days. Do not fill before December 31, 2024. 14 patch 0    nicotine polacrilex (Nicorette) 2 mg gum Chew 1 each (2 mg) if needed for smoking cessation. Do not fill before December 31, 2024. 100 each 0    venlafaxine (Effexor) 75 mg tablet Take 1 tablet (75 mg) by mouth once daily. 90 tablet 1    venlafaxine 150 mg 24 hr tablet 1 tablet (150 mg) once daily.        No current facility-administered medications for this visit.       Objective     Lab on 2024   Component Date Value Ref Range Status    Thyroid Stimulating Hormone 2024 2.46  0.44 - 3.98 mIU/L Final    Vitamin D, 25-Hydroxy, Total 2024 25 (L)  30 - 100 ng/mL Final    Calcium 2024 9.5  8.6 - 10.6 mg/dL Final       Radiology: Reviewed imaging in powerchart.  No results found.    Family History   Problem Relation Name Age of Onset    Other (HTN) Mother      Other (HLD) Mother      Diabetes Father      Heart disease Father      Brain Aneurysm Father      Colon cancer Father      Diabetes Sister      Breast cancer Sister      No Known Problems Maternal Grandmother      No Known Problems Maternal Grandfather      No Known Problems Paternal Grandmother      Diabetes Paternal Grandfather       Social History     Socioeconomic History    Marital status:    Tobacco Use    Smoking status: Every Day     Current packs/day: 0.25     Average packs/day: 0.2 packs/day for 49.1 years (12.3 ttl pk-yrs)     Types: Cigarettes     Start date: 1976     Passive exposure: Past    Smokeless tobacco: Never   Vaping Use    Vaping status: Never Used   Substance and Sexual Activity    Alcohol use: Never    Drug use: Never    Sexual activity: Not Currently     Partners: Male     Birth control/protection: None     Past Medical History:   Diagnosis Date    Anxiety     Mcintyre's esophagus     Breast cancer (Multi)     Cervical disc disease     Depression     Easy bruising     Hx antineoplastic chemo     Hyperlipidemia     Hypothyroidism     Lumbar disc disease     Personal history of irradiation     Personal history of other mental and behavioral disorders     History of anxiety    Spinal stenosis      Past Surgical History:   Procedure Laterality Date    BACK SURGERY      Cervical and lumbar (pt does not know what was done).     SECTION, LOW TRANSVERSE      COLONOSCOPY      MASTECTOMY Right      ORIF WRIST FRACTURE      OTHER SURGICAL HISTORY      Abdominal aortic stent graft/tube graft for aortic dissection    OTHER SURGICAL HISTORY      Abdominal aortic and bilateral iliac thromboembolectomy    OTHER SURGICAL HISTORY      Right iliac covered stent; left iliac artery covered stent; right common femoral thrombectomy with patch; repair right common femoral artery dissection; repair blood vessel right femoral    UPPER GASTROINTESTINAL ENDOSCOPY         Charting was completed using voice recognition technology and may include unintended errors.

## 2025-03-12 ENCOUNTER — TELEPHONE (OUTPATIENT)
Dept: PRIMARY CARE | Facility: CLINIC | Age: 65
End: 2025-03-12
Payer: COMMERCIAL

## 2025-03-12 DIAGNOSIS — R94.39 ABNORMAL STRESS TEST: ICD-10-CM

## 2025-03-12 DIAGNOSIS — E78.01 FAMILIAL HYPERCHOLESTEROLEMIA: ICD-10-CM

## 2025-03-12 DIAGNOSIS — J44.9 OBSTRUCTIVE LUNG DISEASE (GENERALIZED) (MULTI): ICD-10-CM

## 2025-03-12 DIAGNOSIS — F32.1 MAJOR DEPRESSIVE DISORDER, SINGLE EPISODE, MODERATE (MULTI): ICD-10-CM

## 2025-03-12 NOTE — TELEPHONE ENCOUNTER
----- Message from Sveta Mcknight sent at 3/12/2025 12:09 PM EDT -----  Refer to  cardiology. Abnormal stress test. Place referral as urgent.

## 2025-03-13 RX ORDER — VENLAFAXINE HYDROCHLORIDE 150 MG/1
150 TABLET, EXTENDED RELEASE ORAL DAILY
Qty: 30 TABLET | Refills: 3 | Status: SHIPPED | OUTPATIENT
Start: 2025-03-13 | End: 2025-03-14 | Stop reason: WASHOUT

## 2025-03-13 RX ORDER — FLUTICASONE FUROATE, UMECLIDINIUM BROMIDE AND VILANTEROL TRIFENATATE 100; 62.5; 25 UG/1; UG/1; UG/1
1 POWDER RESPIRATORY (INHALATION) DAILY
Qty: 28 EACH | Refills: 2 | Status: SHIPPED | OUTPATIENT
Start: 2025-03-13

## 2025-03-13 RX ORDER — ATORVASTATIN CALCIUM 10 MG/1
10 TABLET, FILM COATED ORAL DAILY
Qty: 90 TABLET | Refills: 1 | Status: SHIPPED | OUTPATIENT
Start: 2025-03-13

## 2025-03-13 NOTE — TELEPHONE ENCOUNTER
Spoke to patient informed her of results. Referall ordered as urgent, gave her general scheduling number if needed.

## 2025-03-14 DIAGNOSIS — F32.1 MAJOR DEPRESSIVE DISORDER, SINGLE EPISODE, MODERATE (MULTI): ICD-10-CM

## 2025-03-14 RX ORDER — VENLAFAXINE HYDROCHLORIDE 150 MG/1
150 CAPSULE, EXTENDED RELEASE ORAL DAILY
Qty: 30 CAPSULE | Refills: 3 | Status: SHIPPED | OUTPATIENT
Start: 2025-03-14 | End: 2025-07-12

## 2025-03-19 ENCOUNTER — OFFICE VISIT (OUTPATIENT)
Dept: CARDIOLOGY | Facility: CLINIC | Age: 65
End: 2025-03-19
Payer: COMMERCIAL

## 2025-03-19 VITALS
DIASTOLIC BLOOD PRESSURE: 80 MMHG | SYSTOLIC BLOOD PRESSURE: 134 MMHG | BODY MASS INDEX: 31.53 KG/M2 | WEIGHT: 178 LBS | OXYGEN SATURATION: 97 % | RESPIRATION RATE: 16 BRPM | HEART RATE: 100 BPM

## 2025-03-19 DIAGNOSIS — Z95.820 S/P PERIPHERAL ARTERY ANGIOPLASTY WITH STENT PLACEMENT: ICD-10-CM

## 2025-03-19 DIAGNOSIS — R07.89 ATYPICAL CHEST PAIN: ICD-10-CM

## 2025-03-19 DIAGNOSIS — Z01.812 ENCOUNTER FOR PRE-OPERATIVE LABORATORY TESTING: ICD-10-CM

## 2025-03-19 DIAGNOSIS — R94.39 ABNORMAL NUCLEAR STRESS TEST: ICD-10-CM

## 2025-03-19 DIAGNOSIS — I25.10 ASHD (ARTERIOSCLEROTIC HEART DISEASE): ICD-10-CM

## 2025-03-19 DIAGNOSIS — R94.39 ABNORMAL NUCLEAR STRESS TEST: Primary | ICD-10-CM

## 2025-03-19 PROBLEM — R06.09 DOE (DYSPNEA ON EXERTION): Status: ACTIVE | Noted: 2024-11-04

## 2025-03-19 PROBLEM — Z86.0100 HISTORY OF COLON POLYPS: Status: ACTIVE | Noted: 2025-03-19

## 2025-03-19 PROBLEM — K21.9 CHRONIC GERD: Status: ACTIVE | Noted: 2025-03-19

## 2025-03-19 PROBLEM — E03.9 HYPOTHYROIDISM: Status: ACTIVE | Noted: 2025-03-19

## 2025-03-19 PROBLEM — R07.9 ACUTE CHEST PAIN: Status: ACTIVE | Noted: 2025-01-06

## 2025-03-19 PROBLEM — J98.8: Status: ACTIVE | Noted: 2025-03-19

## 2025-03-19 PROCEDURE — 99214 OFFICE O/P EST MOD 30 MIN: CPT | Performed by: STUDENT IN AN ORGANIZED HEALTH CARE EDUCATION/TRAINING PROGRAM

## 2025-03-19 PROCEDURE — 99204 OFFICE O/P NEW MOD 45 MIN: CPT | Performed by: STUDENT IN AN ORGANIZED HEALTH CARE EDUCATION/TRAINING PROGRAM

## 2025-03-19 NOTE — H&P (VIEW-ONLY)
Cardiology New Patient History and Physical    Reason for referral: Chest pain, abnormal nuclear medicine stress testing    HPI: Yecenia Porter is a 64 y.o.  female who presents today for chest pain abnormal nuclear medicine stress test. 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    Yecenia presented to cardiology clinic on 3/19/2025.  Patient recently had episodes of chest pain that was substernal/epigastric in nature.  Patient underwent further evaluation with nuclear medicine stress testing which was abnormal; myocardial perfusion imaging showed  Moderate size, anterior anterolateral mid to apical reversible ischemia.  Patient's cardiac risk factors include hypertension, dyslipidemia, PAD, family history of heart disease, and tobacco dependency.  Given positive stress test, intermittent chest pains, and above cardiac risk factors recommend further evaluation with diagnostic coronary angiography; patient agreeable.      Past Medical History:   She has a past medical history of Anxiety, Mcintyre's esophagus, Breast cancer (Multi), Cervical disc disease, Depression, Easy bruising, antineoplastic chemo, Hyperlipidemia, Hypothyroidism, Lumbar disc disease, Personal history of irradiation, Personal history of other mental and behavioral disorders, and Spinal stenosis.    Surgical History:   She has a past surgical history that includes Mastectomy (Right, ); Colonoscopy; Other surgical history; Other surgical history; Other surgical history; Upper gastrointestinal endoscopy;  section, low transverse; ORIF wrist fracture; and Back surgery.    Family History:   Family History   Problem Relation Name Age of Onset    Other (HTN) Mother      Other (HLD) Mother      Diabetes Father      Heart  disease Father      Brain Aneurysm Father      Colon cancer Father      Diabetes Sister      Breast cancer Sister      No Known Problems Maternal Grandmother      No Known Problems Maternal Grandfather      No Known Problems Paternal Grandmother      Diabetes Paternal Grandfather       Allergies:  Prozac [fluoxetine] and Gabapentin     Social History:   - daily smoker (trying to quit)     Prior Cardiovascular Testing (personally reviewed):     NM Stress (3/11/2025)  1. Abnormal Lexiscan myocardial perfusion scan.   2. No ECG evidence of vasodilator-induced myocardial ischemia.   3. Moderate size, anterior anterolateral mid to apical reversible   ischemia.   4. Normal LV systolic function.     Review of Systems:  Review of Systems   Constitutional: Negative.   HENT: Negative.     Eyes: Negative.    Cardiovascular:  Positive for chest pain. Negative for near-syncope, orthopnea, palpitations, paroxysmal nocturnal dyspnea and syncope.   Respiratory: Negative.     Endocrine: Negative.    Hematologic/Lymphatic: Negative.    Skin: Negative.    Musculoskeletal: Negative.    Gastrointestinal: Negative.    Genitourinary: Negative.    Neurological: Negative.    Psychiatric/Behavioral: Negative.     Allergic/Immunologic: Negative.        Objective     Outpatient Medications:    Current Outpatient Medications:     albuterol (ProAir HFA) 90 mcg/actuation inhaler, Inhale 2 puffs every 4 hours if needed for wheezing or shortness of breath., Disp: 8.5 g, Rfl: 5    alendronate (Fosamax) 70 mg tablet, See Instructions, TAKE 1 TABLET BY MOUTH EVERY MONDAY 30 MINUTES BEFORE THE AM MEAL WITH WATER. STAY UPRIGHT FOR 30-60 MINUTES AFTER TAKING, 12 tabs, [address] [place] [address] [id number], Instructions Replace Required Details, TAKE 1 TABLET BY MOUTH EVERY..., Disp: , Rfl:     anastrozole (Arimidex) 1 mg tablet, 1 tablet (1 mg total) once daily at bedtime., Disp: , Rfl:     atorvastatin (Lipitor) 10 mg tablet, Take 1 tablet (10 mg) by mouth  once daily., Disp: 90 tablet, Rfl: 1    buPROPion SR (Wellbutrin SR) 150 mg 12 hr tablet, Take 1 tablet (150 mg) by mouth once daily for 3 days, THEN 1 tablet (150 mg) 2 times a day for 27 days. Do not crush, chew, or split.. Do not fill before December 25, 2024., Disp: 57 tablet, Rfl: 0    calcium carbonate (Oyster Shell Calcium 500) 500 mg calcium (1,250 mg) tablet, Take 1 tablet (1,250 mg) by mouth once daily., Disp: , Rfl:     Eliquis 5 mg tablet, Take 1 tablet (5 mg) by mouth 2 times a day., Disp: , Rfl:     fluticasone-umeclidin-vilanter (Trelegy Ellipta) 100-62.5-25 mcg blister with device, Inhale 1 puff once daily., Disp: 28 each, Rfl: 2    levothyroxine (Synthroid, Levoxyl) 50 mcg tablet, Take 1 tablet (50 mcg) by mouth once daily in the morning. Take before meals., Disp: , Rfl:     nicotine (Nicoderm CQ) 7 mg/24 hr patch, Place 1 patch over 24 hours on the skin once every 24 hours for 14 days. Do not fill before December 31, 2024., Disp: 14 patch, Rfl: 0    nicotine polacrilex (Nicorette) 2 mg gum, Chew 1 each (2 mg) if needed for smoking cessation. Do not fill before December 31, 2024., Disp: 100 each, Rfl: 0    venlafaxine (Effexor) 75 mg tablet, Take 1 tablet (75 mg) by mouth once daily., Disp: 90 tablet, Rfl: 1    venlafaxine XR (Effexor-XR) 150 mg 24 hr capsule, Take 1 capsule (150 mg) by mouth once daily. Take with food., Disp: 30 capsule, Rfl: 3     Last Recorded Vitals  There were no vitals taken for this visit.    Physical Exam:  Physical Exam  Constitutional:       General: She is not in acute distress.     Appearance: She is obese.   HENT:      Head: Normocephalic.      Mouth/Throat:      Mouth: Mucous membranes are moist.   Eyes:      Extraocular Movements: Extraocular movements intact.      Conjunctiva/sclera: Conjunctivae normal.   Neck:      Vascular: No carotid bruit or JVD.   Cardiovascular:      Rate and Rhythm: Normal rate and regular rhythm.      Pulses: Normal pulses.      Heart  sounds: No murmur heard.  Pulmonary:      Effort: Pulmonary effort is normal. No respiratory distress.      Breath sounds: Normal breath sounds.   Abdominal:      General: Bowel sounds are normal. There is no distension.      Palpations: Abdomen is soft.   Musculoskeletal:         General: No swelling.   Skin:     General: Skin is warm and dry.   Neurological:      General: No focal deficit present.      Mental Status: She is alert.      Cranial Nerves: No cranial nerve deficit.      Motor: No weakness.   Psychiatric:         Mood and Affect: Mood normal.         Behavior: Behavior normal.         Lab Review:    Lab Results   Component Value Date    GLUCOSE 88 02/16/2024    CALCIUM 9.5 11/18/2024     02/16/2024    K 4.3 02/16/2024    CO2 30 02/16/2024     02/16/2024    BUN 13 02/16/2024    CREATININE 0.76 02/16/2024       Lab Results   Component Value Date    WBC 6.7 02/16/2024    HGB 14.0 02/16/2024    HCT 45.3 02/16/2024    MCV 99 02/16/2024     02/16/2024     Lab Results   Component Value Date    TSH 2.46 11/18/2024       Assessment:   64 y.o.  female who presents today for chest pain abnormal nuclear medicine stress test. 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    Yecenia presented to cardiology clinic on 3/19/2025.  Patient recently had episodes of chest pain that was substernal/epigastric in nature.  Patient underwent further evaluation with nuclear medicine stress testing which was abnormal; myocardial perfusion imaging showed  Moderate size, anterior anterolateral mid to apical reversible ischemia.  Patient's cardiac risk factors include hypertension, dyslipidemia, PAD, family history of heart disease, and tobacco dependency.  Given positive  stress test, intermittent chest pains, and above cardiac risk factors recommend further evaluation with diagnostic coronary angiography; patient agreeable.    Overall Plan:  1.  Abnormal nuclear medicine stress imaging; intermittent chest pain; cardiac risk factors as above  - Diagnostic coronary angiography/left heart catheterization; Check TTE    2.  Atherosclerotic heart disease  - Encouraged heart healthy diet  - Strongly advised tobacco cessation  - Recommend aspirin 81 mg daily    3.  History of thromboembolism  - Continue apixaban twice daily    4.  Dyslipidemia  - Continue atorvastatin; goal LDL less than 70 mg/dL; uptitrate as tolerated goal    5.  Primary hypertension  - Continue dietary lifestyle modifications  - Pharmacotherapy needed would then consider amlodipine    6.  Tobacco dependency  - Strongly encouraged tobacco cessation; patient trying to quit; I personally counseled the patient for 5 minutes on tobacco cessation    7.  Hypothyroidism  - Continue levothyroxine    8.  Breast cancer  - Follow-up as per oncology    Disposition: Return to cardiology clinic after above testing    Maximo Ho MD

## 2025-03-19 NOTE — PROGRESS NOTES
Cardiology New Patient History and Physical    Reason for referral: ***    HPI: Yecenia Porter is a 64 y.o. {ethnicity:34674} female who presents today for ***. Past medical history of ***.      Had a couple days of heart burn > NM stress.     Past Medical History:   She has a past medical history of Anxiety, Mcintyre's esophagus, Breast cancer (Multi), Cervical disc disease, Depression, Easy bruising, antineoplastic chemo, Hyperlipidemia, Hypothyroidism, Lumbar disc disease, Personal history of irradiation, Personal history of other mental and behavioral disorders, and Spinal stenosis.    Surgical History:   She has a past surgical history that includes Mastectomy (Right, ); Colonoscopy; Other surgical history; Other surgical history; Other surgical history; Upper gastrointestinal endoscopy;  section, low transverse; ORIF wrist fracture; and Back surgery.    Family History:   Family History   Problem Relation Name Age of Onset    Other (HTN) Mother      Other (HLD) Mother      Diabetes Father      Heart disease Father      Brain Aneurysm Father      Colon cancer Father      Diabetes Sister      Breast cancer Sister      No Known Problems Maternal Grandmother      No Known Problems Maternal Grandfather      No Known Problems Paternal Grandmother      Diabetes Paternal Grandfather       Allergies:  Prozac [fluoxetine] and Gabapentin     Social History:   - daily smoker (trying to quit)     Prior Cardiovascular Testing (personally reviewed):     NM Stress (3/11/2025)  1. Abnormal Lexiscan myocardial perfusion scan.   2. No ECG evidence of vasodilator-induced myocardial ischemia.   3. Moderate size, anterior anterolateral mid to apical reversible   ischemia.   4. Normal LV systolic function.     Review of Systems:  ROS    Objective     Outpatient Medications:    Current Outpatient Medications:     albuterol (ProAir HFA) 90 mcg/actuation inhaler, Inhale 2 puffs every 4 hours if needed for wheezing or  shortness of breath., Disp: 8.5 g, Rfl: 5    alendronate (Fosamax) 70 mg tablet, See Instructions, TAKE 1 TABLET BY MOUTH EVERY MONDAY 30 MINUTES BEFORE THE AM MEAL WITH WATER. STAY UPRIGHT FOR 30-60 MINUTES AFTER TAKING, 12 tabs, [place], [id number], Instructions Replace Required Details, TAKE 1 TABLET BY MOUTH EVERY..., Disp: , Rfl:     anastrozole (Arimidex) 1 mg tablet, 1 tablet (1 mg total) once daily at bedtime., Disp: , Rfl:     atorvastatin (Lipitor) 10 mg tablet, Take 1 tablet (10 mg) by mouth once daily., Disp: 90 tablet, Rfl: 1    buPROPion SR (Wellbutrin SR) 150 mg 12 hr tablet, Take 1 tablet (150 mg) by mouth once daily for 3 days, THEN 1 tablet (150 mg) 2 times a day for 27 days. Do not crush, chew, or split.. Do not fill before December 25, 2024., Disp: 57 tablet, Rfl: 0    calcium carbonate (Oyster Shell Calcium 500) 500 mg calcium (1,250 mg) tablet, Take 1 tablet (1,250 mg) by mouth once daily., Disp: , Rfl:     Eliquis 5 mg tablet, Take 1 tablet (5 mg) by mouth 2 times a day., Disp: , Rfl:     fluticasone-umeclidin-vilanter (Trelegy Ellipta) 100-62.5-25 mcg blister with device, Inhale 1 puff once daily., Disp: 28 each, Rfl: 2    levothyroxine (Synthroid, Levoxyl) 50 mcg tablet, Take 1 tablet (50 mcg) by mouth once daily in the morning. Take before meals., Disp: , Rfl:     nicotine (Nicoderm CQ) 7 mg/24 hr patch, Place 1 patch over 24 hours on the skin once every 24 hours for 14 days. Do not fill before December 31, 2024., Disp: 14 patch, Rfl: 0    nicotine polacrilex (Nicorette) 2 mg gum, Chew 1 each (2 mg) if needed for smoking cessation. Do not fill before December 31, 2024., Disp: 100 each, Rfl: 0    venlafaxine (Effexor) 75 mg tablet, Take 1 tablet (75 mg) by mouth once daily., Disp: 90 tablet, Rfl: 1    venlafaxine XR (Effexor-XR) 150 mg 24 hr capsule, Take 1 capsule (150 mg) by mouth once daily. Take with food., Disp: 30 capsule, Rfl: 3     Last Recorded Vitals  There were no  vitals taken for this visit.    Physical Exam:  Physical Exam    Lab Review:    Lab Results   Component Value Date    GLUCOSE 88 02/16/2024    CALCIUM 9.5 11/18/2024     02/16/2024    K 4.3 02/16/2024    CO2 30 02/16/2024     02/16/2024    BUN 13 02/16/2024    CREATININE 0.76 02/16/2024       Lab Results   Component Value Date    WBC 6.7 02/16/2024    HGB 14.0 02/16/2024    HCT 45.3 02/16/2024    MCV 99 02/16/2024     02/16/2024     Lab Results   Component Value Date    TSH 2.46 11/18/2024       Assessment:       Overall Plan:      Disposition: Return to cardiology clinic in *** months    Maximo Ho MD         sounds: No murmur heard.  Pulmonary:      Effort: Pulmonary effort is normal. No respiratory distress.      Breath sounds: Normal breath sounds.   Abdominal:      General: Bowel sounds are normal. There is no distension.      Palpations: Abdomen is soft.   Musculoskeletal:         General: No swelling.   Skin:     General: Skin is warm and dry.   Neurological:      General: No focal deficit present.      Mental Status: She is alert.      Cranial Nerves: No cranial nerve deficit.      Motor: No weakness.   Psychiatric:         Mood and Affect: Mood normal.         Behavior: Behavior normal.         Lab Review:    Lab Results   Component Value Date    GLUCOSE 88 02/16/2024    CALCIUM 9.5 11/18/2024     02/16/2024    K 4.3 02/16/2024    CO2 30 02/16/2024     02/16/2024    BUN 13 02/16/2024    CREATININE 0.76 02/16/2024       Lab Results   Component Value Date    WBC 6.7 02/16/2024    HGB 14.0 02/16/2024    HCT 45.3 02/16/2024    MCV 99 02/16/2024     02/16/2024     Lab Results   Component Value Date    TSH 2.46 11/18/2024       Assessment:   64 y.o.  female who presents today for chest pain abnormal nuclear medicine stress test. 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    Yecenia presented to cardiology clinic on 3/19/2025.  Patient recently had episodes of chest pain that was substernal/epigastric in nature.  Patient underwent further evaluation with nuclear medicine stress testing which was abnormal; myocardial perfusion imaging showed  Moderate size, anterior anterolateral mid to apical reversible ischemia.  Patient's cardiac risk factors include hypertension, dyslipidemia, PAD, family history of heart disease, and tobacco dependency.  Given positive  stress test, intermittent chest pains, and above cardiac risk factors recommend further evaluation with diagnostic coronary angiography; patient agreeable.    Overall Plan:  1.  Abnormal nuclear medicine stress imaging; intermittent chest pain; cardiac risk factors as above  - Diagnostic coronary angiography/left heart catheterization; Check TTE    2.  Atherosclerotic heart disease  - Encouraged heart healthy diet  - Strongly advised tobacco cessation  - Recommend aspirin 81 mg daily    3.  History of thromboembolism  - Continue apixaban twice daily    4.  Dyslipidemia  - Continue atorvastatin; goal LDL less than 70 mg/dL; uptitrate as tolerated goal    5.  Primary hypertension  - Continue dietary lifestyle modifications  - Pharmacotherapy needed would then consider amlodipine    6.  Tobacco dependency  - Strongly encouraged tobacco cessation; patient trying to quit; I personally counseled the patient for 5 minutes on tobacco cessation    7.  Hypothyroidism  - Continue levothyroxine    8.  Breast cancer  - Follow-up as per oncology    Disposition: Return to cardiology clinic after above testing    Maximo Ho MD

## 2025-03-19 NOTE — PATIENT INSTRUCTIONS
Thank you for your visit today. Please contact our office (via MyChart or phone) with any additional questions.     [facility] [place]    Chela, RN/Clinic Nurse for:    Dr. Georgia Sommer    [address], [address]  [place] [address]    Phone: [phone] Press Option 5 then Option 3 to speak with the Clinic Nurse (Chela)    _____    To Reach:    Billing Questions -    [phone]  Scheduling / Rescheduling -  Option 1  Refills / Medication Requests -  Option 3  General Office / [place] -  Option 4  Results -     Option 6  Medical Records -    Option 7  Repeat Options -    Option 9

## 2025-03-23 LAB
ALBUMIN SERPL-MCNC: 4.4 G/DL (ref 3.6–5.1)
ALP SERPL-CCNC: 97 U/L (ref 37–153)
ALT SERPL-CCNC: 18 U/L (ref 6–29)
ANION GAP SERPL CALCULATED.4IONS-SCNC: 10 MMOL/L (CALC) (ref 7–17)
AST SERPL-CCNC: 14 U/L (ref 10–35)
BILIRUB SERPL-MCNC: 0.4 MG/DL (ref 0.2–1.2)
BUN SERPL-MCNC: 13 MG/DL (ref 7–25)
CALCIUM SERPL-MCNC: 9.3 MG/DL (ref 8.6–10.4)
CHLORIDE SERPL-SCNC: 106 MMOL/L (ref 98–110)
CHOLEST SERPL-MCNC: 203 MG/DL
CHOLEST/HDLC SERPL: 4.1 (CALC)
CO2 SERPL-SCNC: 27 MMOL/L (ref 20–32)
CREAT SERPL-MCNC: 0.84 MG/DL (ref 0.5–1.05)
EGFRCR SERPLBLD CKD-EPI 2021: 78 ML/MIN/1.73M2
ERYTHROCYTE [DISTWIDTH] IN BLOOD BY AUTOMATED COUNT: 13 % (ref 11–15)
GLUCOSE SERPL-MCNC: 106 MG/DL (ref 65–99)
HCT VFR BLD AUTO: 46.8 % (ref 35–45)
HDLC SERPL-MCNC: 49 MG/DL
HGB BLD-MCNC: 15.4 G/DL (ref 11.7–15.5)
LDLC SERPL CALC-MCNC: 128 MG/DL (CALC)
MCH RBC QN AUTO: 31.7 PG (ref 27–33)
MCHC RBC AUTO-ENTMCNC: 32.9 G/DL (ref 32–36)
MCV RBC AUTO: 96.3 FL (ref 80–100)
NONHDLC SERPL-MCNC: 154 MG/DL (CALC)
PLATELET # BLD AUTO: 243 THOUSAND/UL (ref 140–400)
PMV BLD REES-ECKER: 10.7 FL (ref 7.5–12.5)
POTASSIUM SERPL-SCNC: 4.9 MMOL/L (ref 3.5–5.3)
PROT SERPL-MCNC: 6.8 G/DL (ref 6.1–8.1)
RBC # BLD AUTO: 4.86 MILLION/UL (ref 3.8–5.1)
SODIUM SERPL-SCNC: 143 MMOL/L (ref 135–146)
TRIGL SERPL-MCNC: 149 MG/DL
WBC # BLD AUTO: 6.9 THOUSAND/UL (ref 3.8–10.8)

## 2025-03-24 ENCOUNTER — HOSPITAL ENCOUNTER (OUTPATIENT)
Dept: CARDIOLOGY | Facility: CLINIC | Age: 65
Discharge: HOME | End: 2025-03-24
Payer: COMMERCIAL

## 2025-03-24 DIAGNOSIS — R07.89 ATYPICAL CHEST PAIN: ICD-10-CM

## 2025-03-24 DIAGNOSIS — R94.39 ABNORMAL NUCLEAR STRESS TEST: ICD-10-CM

## 2025-03-24 DIAGNOSIS — R07.9 CHEST PAIN, UNSPECIFIED: ICD-10-CM

## 2025-03-24 PROCEDURE — 93306 TTE W/DOPPLER COMPLETE: CPT | Performed by: STUDENT IN AN ORGANIZED HEALTH CARE EDUCATION/TRAINING PROGRAM

## 2025-03-24 PROCEDURE — 93306 TTE W/DOPPLER COMPLETE: CPT

## 2025-03-27 LAB
AORTIC VALVE MEAN GRADIENT: 3 MMHG
AORTIC VALVE PEAK VELOCITY: 1.11 M/S
AV PEAK GRADIENT: 5 MMHG
AVA (PEAK VEL): 2.41 CM2
AVA (VTI): 2.22 CM2
EJECTION FRACTION APICAL 4 CHAMBER: 62.2
EJECTION FRACTION: 64 %
LEFT ATRIUM VOLUME AREA LENGTH INDEX BSA: 22.2 ML/M2
LEFT VENTRICLE INTERNAL DIMENSION DIASTOLE: 4.13 CM (ref 3.5–6)
LEFT VENTRICULAR OUTFLOW TRACT DIAMETER: 2.04 CM
RIGHT VENTRICLE FREE WALL PEAK S': 0.11 CM/S
RIGHT VENTRICLE PEAK SYSTOLIC PRESSURE: 19.6 MMHG
TRICUSPID ANNULAR PLANE SYSTOLIC EXCURSION: 2 CM

## 2025-04-02 PROBLEM — E66.811 CLASS 1 OBESITY WITH BODY MASS INDEX (BMI) OF 31.0 TO 31.9 IN ADULT: Status: ACTIVE | Noted: 2025-04-02

## 2025-04-03 ASSESSMENT — ENCOUNTER SYMPTOMS
RESPIRATORY NEGATIVE: 1
ALLERGIC/IMMUNOLOGIC NEGATIVE: 1
NEAR-SYNCOPE: 0
EYES NEGATIVE: 1
SYNCOPE: 0
HEMATOLOGIC/LYMPHATIC NEGATIVE: 1
NEUROLOGICAL NEGATIVE: 1
ENDOCRINE NEGATIVE: 1
MUSCULOSKELETAL NEGATIVE: 1
ORTHOPNEA: 0
CONSTITUTIONAL NEGATIVE: 1
PSYCHIATRIC NEGATIVE: 1
PALPITATIONS: 0
GASTROINTESTINAL NEGATIVE: 1
PND: 0

## 2025-04-04 ENCOUNTER — HOSPITAL ENCOUNTER (OUTPATIENT)
Dept: CARDIOLOGY | Facility: HOSPITAL | Age: 65
Discharge: HOME | End: 2025-04-04
Payer: COMMERCIAL

## 2025-04-04 ENCOUNTER — HOSPITAL ENCOUNTER (OUTPATIENT)
Facility: HOSPITAL | Age: 65
Setting detail: OUTPATIENT SURGERY
Discharge: HOME | End: 2025-04-04
Attending: STUDENT IN AN ORGANIZED HEALTH CARE EDUCATION/TRAINING PROGRAM | Admitting: STUDENT IN AN ORGANIZED HEALTH CARE EDUCATION/TRAINING PROGRAM
Payer: COMMERCIAL

## 2025-04-04 VITALS
RESPIRATION RATE: 16 BRPM | WEIGHT: 175.49 LBS | BODY MASS INDEX: 31.09 KG/M2 | HEIGHT: 63 IN | DIASTOLIC BLOOD PRESSURE: 85 MMHG | SYSTOLIC BLOOD PRESSURE: 140 MMHG | HEART RATE: 95 BPM | OXYGEN SATURATION: 96 % | TEMPERATURE: 97.2 F

## 2025-04-04 DIAGNOSIS — R94.39 ABNORMAL NUCLEAR STRESS TEST: ICD-10-CM

## 2025-04-04 DIAGNOSIS — I25.10 ASHD (ARTERIOSCLEROTIC HEART DISEASE): ICD-10-CM

## 2025-04-04 DIAGNOSIS — E78.01 FAMILIAL HYPERCHOLESTEROLEMIA: ICD-10-CM

## 2025-04-04 DIAGNOSIS — I20.0 UNSTABLE ANGINA (MULTI): ICD-10-CM

## 2025-04-04 DIAGNOSIS — I25.10 CORONARY ARTERY DISEASE INVOLVING NATIVE CORONARY ARTERY OF NATIVE HEART, UNSPECIFIED WHETHER ANGINA PRESENT: Primary | ICD-10-CM

## 2025-04-04 DIAGNOSIS — R07.89 ATYPICAL CHEST PAIN: ICD-10-CM

## 2025-04-04 LAB
INR PPP: 1 (ref 0.9–1.1)
PROTHROMBIN TIME: 10.5 SECONDS (ref 9.8–12.4)

## 2025-04-04 PROCEDURE — 2780000003 HC OR 278 NO HCPCS: Performed by: STUDENT IN AN ORGANIZED HEALTH CARE EDUCATION/TRAINING PROGRAM

## 2025-04-04 PROCEDURE — 85610 PROTHROMBIN TIME: CPT | Performed by: STUDENT IN AN ORGANIZED HEALTH CARE EDUCATION/TRAINING PROGRAM

## 2025-04-04 PROCEDURE — 2720000007 HC OR 272 NO HCPCS: Performed by: STUDENT IN AN ORGANIZED HEALTH CARE EDUCATION/TRAINING PROGRAM

## 2025-04-04 PROCEDURE — 2500000004 HC RX 250 GENERAL PHARMACY W/ HCPCS (ALT 636 FOR OP/ED): Performed by: STUDENT IN AN ORGANIZED HEALTH CARE EDUCATION/TRAINING PROGRAM

## 2025-04-04 PROCEDURE — 96373 THER/PROPH/DIAG INJ IA: CPT | Performed by: STUDENT IN AN ORGANIZED HEALTH CARE EDUCATION/TRAINING PROGRAM

## 2025-04-04 PROCEDURE — 93005 ELECTROCARDIOGRAM TRACING: CPT

## 2025-04-04 PROCEDURE — 36415 COLL VENOUS BLD VENIPUNCTURE: CPT | Performed by: STUDENT IN AN ORGANIZED HEALTH CARE EDUCATION/TRAINING PROGRAM

## 2025-04-04 PROCEDURE — 99152 MOD SED SAME PHYS/QHP 5/>YRS: CPT | Performed by: STUDENT IN AN ORGANIZED HEALTH CARE EDUCATION/TRAINING PROGRAM

## 2025-04-04 PROCEDURE — 2550000001 HC RX 255 CONTRASTS: Performed by: STUDENT IN AN ORGANIZED HEALTH CARE EDUCATION/TRAINING PROGRAM

## 2025-04-04 PROCEDURE — C1894 INTRO/SHEATH, NON-LASER: HCPCS | Performed by: STUDENT IN AN ORGANIZED HEALTH CARE EDUCATION/TRAINING PROGRAM

## 2025-04-04 PROCEDURE — 2500000001 HC RX 250 WO HCPCS SELF ADMINISTERED DRUGS (ALT 637 FOR MEDICARE OP): Performed by: STUDENT IN AN ORGANIZED HEALTH CARE EDUCATION/TRAINING PROGRAM

## 2025-04-04 PROCEDURE — 93458 L HRT ARTERY/VENTRICLE ANGIO: CPT | Performed by: STUDENT IN AN ORGANIZED HEALTH CARE EDUCATION/TRAINING PROGRAM

## 2025-04-04 PROCEDURE — 7100000010 HC PHASE TWO TIME - EACH INCREMENTAL 1 MINUTE: Performed by: STUDENT IN AN ORGANIZED HEALTH CARE EDUCATION/TRAINING PROGRAM

## 2025-04-04 PROCEDURE — 7100000009 HC PHASE TWO TIME - INITIAL BASE CHARGE: Performed by: STUDENT IN AN ORGANIZED HEALTH CARE EDUCATION/TRAINING PROGRAM

## 2025-04-04 PROCEDURE — C1887 CATHETER, GUIDING: HCPCS | Performed by: STUDENT IN AN ORGANIZED HEALTH CARE EDUCATION/TRAINING PROGRAM

## 2025-04-04 PROCEDURE — C1760 CLOSURE DEV, VASC: HCPCS | Performed by: STUDENT IN AN ORGANIZED HEALTH CARE EDUCATION/TRAINING PROGRAM

## 2025-04-04 RX ORDER — ASPIRIN 325 MG
325 TABLET ORAL ONCE
Status: COMPLETED | OUTPATIENT
Start: 2025-04-04 | End: 2025-04-04

## 2025-04-04 RX ORDER — ATORVASTATIN CALCIUM 80 MG/1
80 TABLET, FILM COATED ORAL DAILY
Qty: 90 TABLET | Refills: 3 | Status: SHIPPED | OUTPATIENT
Start: 2025-04-04 | End: 2026-04-04

## 2025-04-04 RX ORDER — FENTANYL CITRATE 50 UG/ML
INJECTION, SOLUTION INTRAMUSCULAR; INTRAVENOUS AS NEEDED
Status: DISCONTINUED | OUTPATIENT
Start: 2025-04-04 | End: 2025-04-04 | Stop reason: HOSPADM

## 2025-04-04 RX ORDER — SODIUM CHLORIDE 9 MG/ML
INJECTION, SOLUTION INTRAVENOUS CONTINUOUS PRN
Status: DISCONTINUED | OUTPATIENT
Start: 2025-04-04 | End: 2025-04-04 | Stop reason: HOSPADM

## 2025-04-04 RX ORDER — VERAPAMIL HYDROCHLORIDE 2.5 MG/ML
INJECTION, SOLUTION INTRAVENOUS AS NEEDED
Status: DISCONTINUED | OUTPATIENT
Start: 2025-04-04 | End: 2025-04-04 | Stop reason: HOSPADM

## 2025-04-04 RX ORDER — HEPARIN SODIUM 1000 [USP'U]/ML
INJECTION, SOLUTION INTRAVENOUS; SUBCUTANEOUS AS NEEDED
Status: DISCONTINUED | OUTPATIENT
Start: 2025-04-04 | End: 2025-04-04 | Stop reason: HOSPADM

## 2025-04-04 RX ORDER — LIDOCAINE HYDROCHLORIDE 20 MG/ML
INJECTION, SOLUTION INFILTRATION; PERINEURAL AS NEEDED
Status: DISCONTINUED | OUTPATIENT
Start: 2025-04-04 | End: 2025-04-04 | Stop reason: HOSPADM

## 2025-04-04 RX ORDER — NITROGLYCERIN 40 MG/100ML
INJECTION INTRAVENOUS AS NEEDED
Status: DISCONTINUED | OUTPATIENT
Start: 2025-04-04 | End: 2025-04-04 | Stop reason: HOSPADM

## 2025-04-04 RX ORDER — NAPROXEN SODIUM 220 MG/1
81 TABLET, FILM COATED ORAL DAILY
Qty: 90 TABLET | Refills: 3 | Status: SHIPPED | OUTPATIENT
Start: 2025-04-04 | End: 2026-04-04

## 2025-04-04 RX ORDER — LEVOTHYROXINE SODIUM 50 UG/1
50 TABLET ORAL DAILY
COMMUNITY

## 2025-04-04 RX ORDER — MIDAZOLAM HYDROCHLORIDE 1 MG/ML
INJECTION, SOLUTION INTRAMUSCULAR; INTRAVENOUS AS NEEDED
Status: DISCONTINUED | OUTPATIENT
Start: 2025-04-04 | End: 2025-04-04 | Stop reason: HOSPADM

## 2025-04-04 RX ADMIN — ASPIRIN 325 MG ORAL TABLET 325 MG: 325 PILL ORAL at 08:17

## 2025-04-04 ASSESSMENT — PAIN SCALES - GENERAL

## 2025-04-04 ASSESSMENT — PAIN - FUNCTIONAL ASSESSMENT: PAIN_FUNCTIONAL_ASSESSMENT: 0-10

## 2025-04-04 ASSESSMENT — COLUMBIA-SUICIDE SEVERITY RATING SCALE - C-SSRS
6. HAVE YOU EVER DONE ANYTHING, STARTED TO DO ANYTHING, OR PREPARED TO DO ANYTHING TO END YOUR LIFE?: NO
1. IN THE PAST MONTH, HAVE YOU WISHED YOU WERE DEAD OR WISHED YOU COULD GO TO SLEEP AND NOT WAKE UP?: NO
2. HAVE YOU ACTUALLY HAD ANY THOUGHTS OF KILLING YOURSELF?: NO

## 2025-04-04 NOTE — POST-PROCEDURE NOTE
Physician Transition of Care Summary  Invasive Cardiovascular Lab    Procedure Date: 4/4/2025  Attending:    * Mart Leach - Primary  Complications:  No in-lab complications observed.     Cardiac Cath Post Procedure Notes:  Post Procedure Diagnosis: Mild nonobstructive coronary artery disease.  Blood Loss:               Estimated blood loss during the procedure was 5 mls.  Specimens Removed:        Number of specimen(s) removed: none.        Recommendations:  Maximize medical therapy.  Agressive risk factor modification efforts.  Follow-up with cardiology clinic.  Lipid lowering agent or Statin therapy.  Aspirin therapy.     ____________________________________________________________________________________  CONCLUSIONS:   1. Luminal irregularities in left main            Proximal LAD 20 to 30% stenosis with luminal irregularities and tortuosity throughout LAD. There is mild myocardial bridging in mid LAD.            Large dominant left circumflex system with luminal irregularities and severe tortuosities            Small nondominant RCA with luminal irregularities            Left dominant coronary system            LVEDP 15 mmHg without significant gradient across aortic valve.       Please refer to the full report that is in the system.    Electronically signed by: Mart Leach MD PhD, 4/4/2025 10:21 AM

## 2025-04-04 NOTE — NURSING NOTE
Discharge instructions given to patient with  at the bedside. Patient and family verbalized understanding, VSS with no complaints of pain. Right radial site intact with no signs of bleeding or hematoma. PIV removed all belongings returned and patient stable for discharge.

## 2025-04-07 LAB
ATRIAL RATE: 95 BPM
P AXIS: 0 DEGREES
P OFFSET: 200 MS
P ONSET: 158 MS
PR INTERVAL: 110 MS
Q ONSET: 213 MS
QRS COUNT: 16 BEATS
QRS DURATION: 92 MS
QT INTERVAL: 378 MS
QTC CALCULATION(BAZETT): 475 MS
QTC FREDERICIA: 440 MS
R AXIS: -7 DEGREES
T AXIS: 21 DEGREES
T OFFSET: 402 MS
VENTRICULAR RATE: 95 BPM

## 2025-04-15 ENCOUNTER — TELEPHONE (OUTPATIENT)
Dept: CARDIOLOGY | Facility: CLINIC | Age: 65
End: 2025-04-15
Payer: COMMERCIAL

## 2025-04-15 NOTE — TELEPHONE ENCOUNTER
----- Message from Maximo Ho sent at 4/15/2025 12:15 AM EDT -----  Please let patient know that her echocardiogram showed normal heart strength.  ----- Message -----  From: Sabi Syngo - Cardiology Results In  Sent: 3/27/2025  12:56 PM EDT  To: Maximo Ho MD

## 2025-04-23 ENCOUNTER — OFFICE VISIT (OUTPATIENT)
Dept: CARDIOLOGY | Facility: CLINIC | Age: 65
End: 2025-04-23
Payer: COMMERCIAL

## 2025-04-23 VITALS
OXYGEN SATURATION: 91 % | DIASTOLIC BLOOD PRESSURE: 78 MMHG | HEART RATE: 89 BPM | BODY MASS INDEX: 31.04 KG/M2 | SYSTOLIC BLOOD PRESSURE: 128 MMHG | HEIGHT: 63 IN | WEIGHT: 175.2 LBS

## 2025-04-23 DIAGNOSIS — R07.89 ATYPICAL CHEST PAIN: ICD-10-CM

## 2025-04-23 DIAGNOSIS — Z95.820 S/P PERIPHERAL ARTERY ANGIOPLASTY WITH STENT PLACEMENT: ICD-10-CM

## 2025-04-23 DIAGNOSIS — I25.10 ASHD (ARTERIOSCLEROTIC HEART DISEASE): ICD-10-CM

## 2025-04-23 DIAGNOSIS — R94.39 ABNORMAL NUCLEAR STRESS TEST: Primary | ICD-10-CM

## 2025-04-23 LAB
ATRIAL RATE: 89 BPM
P AXIS: 49 DEGREES
P OFFSET: 212 MS
P ONSET: 160 MS
PR INTERVAL: 136 MS
Q ONSET: 228 MS
QRS COUNT: 14 BEATS
QRS DURATION: 78 MS
QT INTERVAL: 370 MS
QTC CALCULATION(BAZETT): 450 MS
QTC FREDERICIA: 421 MS
R AXIS: 18 DEGREES
T AXIS: 14 DEGREES
T OFFSET: 413 MS
VENTRICULAR RATE: 89 BPM

## 2025-04-23 PROCEDURE — 93005 ELECTROCARDIOGRAM TRACING: CPT | Performed by: STUDENT IN AN ORGANIZED HEALTH CARE EDUCATION/TRAINING PROGRAM

## 2025-04-23 PROCEDURE — 99213 OFFICE O/P EST LOW 20 MIN: CPT | Performed by: STUDENT IN AN ORGANIZED HEALTH CARE EDUCATION/TRAINING PROGRAM

## 2025-04-23 PROCEDURE — 93010 ELECTROCARDIOGRAM REPORT: CPT | Performed by: STUDENT IN AN ORGANIZED HEALTH CARE EDUCATION/TRAINING PROGRAM

## 2025-04-23 PROCEDURE — 99212 OFFICE O/P EST SF 10 MIN: CPT | Performed by: STUDENT IN AN ORGANIZED HEALTH CARE EDUCATION/TRAINING PROGRAM

## 2025-04-23 PROCEDURE — 3008F BODY MASS INDEX DOCD: CPT | Performed by: STUDENT IN AN ORGANIZED HEALTH CARE EDUCATION/TRAINING PROGRAM

## 2025-04-23 ASSESSMENT — ENCOUNTER SYMPTOMS
CONSTITUTIONAL NEGATIVE: 1
GASTROINTESTINAL NEGATIVE: 1
SYNCOPE: 0
ALLERGIC/IMMUNOLOGIC NEGATIVE: 1
NEUROLOGICAL NEGATIVE: 1
ORTHOPNEA: 0
PALPITATIONS: 0
RESPIRATORY NEGATIVE: 1
PND: 0
NEAR-SYNCOPE: 0
PSYCHIATRIC NEGATIVE: 1
EYES NEGATIVE: 1
ENDOCRINE NEGATIVE: 1
HEMATOLOGIC/LYMPHATIC NEGATIVE: 1
MUSCULOSKELETAL NEGATIVE: 1

## 2025-04-23 NOTE — PROGRESS NOTES
Cardiology Established Outpatient Visit    Reason for visit: Chest pain (resolved), abnormal nuclear medicine stress testing    HPI: Yecenia Porter is a 64 y.o.  female who presents today for a follow-up visit. 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    Yecenia presented to cardiology clinic on 3/19/2025.  Patient recently had episodes of chest pain that was substernal/epigastric in nature.  Patient underwent further evaluation with nuclear medicine stress testing which was abnormal; myocardial perfusion imaging showed  Moderate size, anterior anterolateral mid to apical reversible ischemia.  Patient's cardiac risk factors include hypertension, dyslipidemia, PAD, family history of heart disease, and tobacco dependency.  Given positive stress test, intermittent chest pains, and above cardiac risk factors recommend further evaluation with diagnostic coronary angiography; patient agreeable.    Yecenia returned to cardiology clinic on 4/23/2025.  Patient currently chest pain-free.  Diagnostic coronary angiography revealed mild nonobstructive coronary disease in the LAD with a left dominant system; medical management recommended.  Will continue cardiac management as below.  Patient appears euvolemic on exam.  Blood pressure well-controlled.      Past Medical History:   She has a past medical history of Anxiety, Mcintyre's esophagus, Breast cancer, Cervical disc disease, Depression, Easy bruising, antineoplastic chemo, Hyperlipidemia, Hypothyroidism, Lumbar disc disease, Personal history of irradiation, Personal history of other mental and behavioral disorders, and Spinal stenosis.    Surgical History:   She has a past surgical history that includes Mastectomy (Right, 2020); Colonoscopy; Other  surgical history; Other surgical history; Other surgical history; Upper gastrointestinal endoscopy;  section, low transverse; ORIF wrist fracture; Back surgery; and Cardiac catheterization (N/A, 2025).    Family History:   Family History   Problem Relation Name Age of Onset    Other (HTN) Mother      Other (HLD) Mother      Diabetes Father      Heart disease Father      Brain Aneurysm Father      Colon cancer Father      Diabetes Sister      Breast cancer Sister      No Known Problems Maternal Grandmother      No Known Problems Maternal Grandfather      No Known Problems Paternal Grandmother      Diabetes Paternal Grandfather       Allergies:  Prozac [fluoxetine] and Gabapentin     Social History:   - daily smoker (trying to quit)     Prior Cardiovascular Testing (personally reviewed):     Coronary angiography (2025)  Luminal irregularities in left main  Proximal LAD 20 to 30% stenosis with luminal irregularities and tortuosity throughout LAD. There is mild myocardial bridging in mid LAD.  Large dominant left circumflex system with luminal irregularities and severe tortuosities  Small nondominant RCA with luminal irregularities  Left dominant coronary system  LVEDP 15 mmHg without significant gradient across aortic valve.     TTE (3/24/2025)   1. Left ventricular ejection fraction is normal, calculated by Flores's biplane at 64%.   2. Spectral Doppler shows a Grade I (impaired relaxation pattern) of left ventricular diastolic filling with normal left atrial filling pressure.   3. There is normal right ventricular global systolic function.   4. Right ventricular systolic pressure is within normal limits.    ECG (2025)- Normal sinus rhythm; normal ECG    NM Stress (3/11/2025)  1. Abnormal Lexiscan myocardial perfusion scan.   2. No ECG evidence of vasodilator-induced myocardial ischemia.   3. Moderate size, anterior anterolateral mid to apical reversible   ischemia.   4. Normal LV systolic  function.     Review of Systems:  Review of Systems   Constitutional: Negative.   HENT: Negative.     Eyes: Negative.    Cardiovascular:  Negative for chest pain, near-syncope, orthopnea, palpitations, paroxysmal nocturnal dyspnea and syncope.   Respiratory: Negative.     Endocrine: Negative.    Hematologic/Lymphatic: Negative.    Skin: Negative.    Musculoskeletal: Negative.    Gastrointestinal: Negative.    Genitourinary: Negative.    Neurological: Negative.    Psychiatric/Behavioral: Negative.     Allergic/Immunologic: Negative.        Objective     Outpatient Medications:    Current Outpatient Medications:     albuterol (ProAir HFA) 90 mcg/actuation inhaler, Inhale 2 puffs every 4 hours if needed for wheezing or shortness of breath., Disp: 8.5 g, Rfl: 5    alendronate (Fosamax) 70 mg tablet, See Instructions, TAKE 1 TABLET BY MOUTH EVERY MONDAY 30 MINUTES BEFORE THE AM MEAL WITH WATER. STAY UPRIGHT FOR 30-60 MINUTES AFTER TAKING, 12 tabs, [address] [place] [address] [id number], Instructions Replace Required Details, TAKE 1 TABLET BY MOUTH EVERY..., Disp: , Rfl:     anastrozole (Arimidex) 1 mg tablet, 1 tablet (1 mg total) once daily at bedtime., Disp: , Rfl:     aspirin 81 mg chewable tablet, Chew 1 tablet (81 mg) once daily., Disp: 90 tablet, Rfl: 3    atorvastatin (Lipitor) 80 mg tablet, Take 1 tablet (80 mg) by mouth once daily., Disp: 90 tablet, Rfl: 3    Eliquis 5 mg tablet, Take 1 tablet (5 mg) by mouth 2 times a day., Disp: , Rfl:     fluticasone-umeclidin-vilanter (Trelegy Ellipta) 100-62.5-25 mcg blister with device, Inhale 1 puff once daily., Disp: 28 each, Rfl: 2    levothyroxine (Synthroid, Levoxyl) 50 mcg tablet, Take 1 tablet (50 mcg) by mouth early in the morning.. Take on an empty stomach at the same time each day, either 30 to 60 minutes prior to breakfast, Disp: , Rfl:     venlafaxine (Effexor) 75 mg tablet, Take 1 tablet (75 mg) by mouth once daily., Disp: 90 tablet, Rfl: 1    venlafaxine XR  "(Effexor-XR) 150 mg 24 hr capsule, Take 1 capsule (150 mg) by mouth once daily. Take with food., Disp: 30 capsule, Rfl: 3     Last Recorded Vitals  /78 (BP Location: Left arm, Patient Position: Sitting, BP Cuff Size: Adult)   Pulse 89   Ht 1.6 m (5' 3\")   Wt 79.5 kg (175 lb 3.2 oz)   SpO2 91%   BMI 31.04 kg/m²     Physical Exam:  Physical Exam  Constitutional:       General: She is not in acute distress.     Appearance: She is obese.   HENT:      Head: Normocephalic.      Mouth/Throat:      Mouth: Mucous membranes are moist.   Eyes:      Extraocular Movements: Extraocular movements intact.      Conjunctiva/sclera: Conjunctivae normal.   Neck:      Vascular: No carotid bruit or JVD.   Cardiovascular:      Rate and Rhythm: Normal rate and regular rhythm.      Pulses: Normal pulses.      Heart sounds: No murmur heard.  Pulmonary:      Effort: Pulmonary effort is normal. No respiratory distress.      Breath sounds: Normal breath sounds.   Abdominal:      General: Bowel sounds are normal. There is no distension.      Palpations: Abdomen is soft.   Musculoskeletal:         General: No swelling.   Skin:     General: Skin is warm and dry.   Neurological:      General: No focal deficit present.      Mental Status: She is alert.      Cranial Nerves: No cranial nerve deficit.      Motor: No weakness.   Psychiatric:         Mood and Affect: Mood normal.         Behavior: Behavior normal.         Lab Review:    Lab Results   Component Value Date    GLUCOSE 106 (H) 03/22/2025    CALCIUM 9.3 03/22/2025     03/22/2025    K 4.9 03/22/2025    CO2 27 03/22/2025     03/22/2025    BUN 13 03/22/2025    CREATININE 0.84 03/22/2025       Lab Results   Component Value Date    WBC 6.9 03/22/2025    HGB 15.4 03/22/2025    HCT 46.8 (H) 03/22/2025    MCV 96.3 03/22/2025     03/22/2025     Lab Results   Component Value Date    TSH 2.46 11/18/2024       Assessment:   64 y.o.  female who presents today for a " follow-up visit. Past medical history of atherosclerotic heart disease, PAD, history of dissection of the right femoral artery status post peripheral artery angioplasty and stent placement, hypertension, dyslipidemia, tobacco dependency, obesity, GERD, hypothyroidismGabino Keene returned to cardiology clinic on 4/23/2025.  Patient currently chest pain-free.  Diagnostic coronary angiography revealed mild nonobstructive coronary disease in the LAD with a left dominant system; medical management recommended.  Will continue cardiac management as below.  Patient appears euvolemic on exam.  Blood pressure well-controlled.    Overall Plan:  1.  Abnormal nuclear medicine stress imaging; intermittent chest pain; cardiac risk factors as above  - Diagnostic coronary angiography/left heart catheterization showed mild non-obstructive CAD (4/4/2025) > continue risk factor modification as below    2.  Atherosclerotic heart disease  - Encouraged heart healthy diet  - Strongly advised tobacco cessation  - Recommend aspirin 81 mg daily    3.  History of thromboembolism  - Continue apixaban twice daily    4.  Dyslipidemia  - Continue atorvastatin; goal LDL less than 70 mg/dL; uptitrate as tolerated goal    5.  Primary hypertension  - Continue dietary lifestyle modifications  - Pharmacotherapy needed would then consider amlodipine    6.  Tobacco dependency  - Strongly encouraged tobacco cessation; patient trying to quit    7.  Hypothyroidism  - Continue levothyroxine    8.  Breast cancer  - Follow-up as per oncology    Disposition: Return to cardiology clinic in 1 year or earlier if needed    Maximo Ho MD

## 2025-04-23 NOTE — PATIENT INSTRUCTIONS
Thank you for your visit today. Please contact our office (via MyChart or phone) with any additional questions.     [facility] [place]    Chela, RN/Clinic Nurse for:    Dr. Georgia Sommer    [address], [address]  [place] [address]    Phone: [phone] Press Option 5 then Option 3 to speak with the Clinic Nurse (Chela)    _____    To Reach:    Billing Questions -    [phone]  Scheduling / Rescheduling -  Option 1  Refills / Medication Requests -  Option 3  General Office / [place] -  Option 4  Results -     Option 6  Medical Records -    Option 7  Repeat Options -    Option 9

## 2025-04-29 ENCOUNTER — APPOINTMENT (OUTPATIENT)
Dept: PRIMARY CARE | Facility: CLINIC | Age: 65
End: 2025-04-29
Payer: COMMERCIAL

## 2025-05-15 ENCOUNTER — APPOINTMENT (OUTPATIENT)
Dept: PRIMARY CARE | Facility: CLINIC | Age: 65
End: 2025-05-15
Payer: COMMERCIAL

## 2025-05-15 VITALS
HEIGHT: 63 IN | SYSTOLIC BLOOD PRESSURE: 130 MMHG | WEIGHT: 180 LBS | DIASTOLIC BLOOD PRESSURE: 86 MMHG | HEART RATE: 96 BPM | OXYGEN SATURATION: 94 % | BODY MASS INDEX: 31.89 KG/M2

## 2025-05-15 DIAGNOSIS — E03.9 HYPOTHYROIDISM, UNSPECIFIED TYPE: ICD-10-CM

## 2025-05-15 DIAGNOSIS — F32.1 MAJOR DEPRESSIVE DISORDER, SINGLE EPISODE, MODERATE (MULTI): ICD-10-CM

## 2025-05-15 DIAGNOSIS — G62.0 CHEMOTHERAPY-INDUCED PERIPHERAL NEUROPATHY (MULTI): ICD-10-CM

## 2025-05-15 DIAGNOSIS — R73.9 HYPERGLYCEMIA: ICD-10-CM

## 2025-05-15 DIAGNOSIS — E55.9 VITAMIN D INSUFFICIENCY: ICD-10-CM

## 2025-05-15 DIAGNOSIS — Z00.00 ANNUAL PHYSICAL EXAM: Primary | ICD-10-CM

## 2025-05-15 DIAGNOSIS — D76.3 OTHER HISTIOCYTOSIS SYNDROMES: ICD-10-CM

## 2025-05-15 DIAGNOSIS — E66.811 CLASS 1 OBESITY WITH SERIOUS COMORBIDITY AND BODY MASS INDEX (BMI) OF 31.0 TO 31.9 IN ADULT, UNSPECIFIED OBESITY TYPE: ICD-10-CM

## 2025-05-15 DIAGNOSIS — M81.0 OSTEOPOROSIS, UNSPECIFIED OSTEOPOROSIS TYPE, UNSPECIFIED PATHOLOGICAL FRACTURE PRESENCE: ICD-10-CM

## 2025-05-15 DIAGNOSIS — J44.9 OBSTRUCTIVE LUNG DISEASE (GENERALIZED) (MULTI): ICD-10-CM

## 2025-05-15 DIAGNOSIS — T45.1X5A CHEMOTHERAPY-INDUCED PERIPHERAL NEUROPATHY (MULTI): ICD-10-CM

## 2025-05-15 PROBLEM — C50.811 MALIGNANT NEOPLASM OF OVERLAPPING SITES OF RIGHT FEMALE BREAST: Status: RESOLVED | Noted: 2024-01-29 | Resolved: 2025-05-15

## 2025-05-15 PROBLEM — C50.911 INFLAMMATORY CARCINOMA OF RIGHT BREAST: Status: RESOLVED | Noted: 2024-03-08 | Resolved: 2025-05-15

## 2025-05-15 PROBLEM — I74.5 ILIAC ARTERY OCCLUSION (MULTI): Status: RESOLVED | Noted: 2024-03-08 | Resolved: 2025-05-15

## 2025-05-15 PROBLEM — I77.77: Status: RESOLVED | Noted: 2024-03-08 | Resolved: 2025-05-15

## 2025-05-15 PROBLEM — I74.9 EMBOLISM AND THROMBOSIS OF ARTERY (MULTI): Status: RESOLVED | Noted: 2024-03-08 | Resolved: 2025-05-15

## 2025-05-15 PROCEDURE — 3008F BODY MASS INDEX DOCD: CPT | Performed by: STUDENT IN AN ORGANIZED HEALTH CARE EDUCATION/TRAINING PROGRAM

## 2025-05-15 PROCEDURE — 99214 OFFICE O/P EST MOD 30 MIN: CPT | Performed by: STUDENT IN AN ORGANIZED HEALTH CARE EDUCATION/TRAINING PROGRAM

## 2025-05-15 PROCEDURE — 99396 PREV VISIT EST AGE 40-64: CPT | Performed by: STUDENT IN AN ORGANIZED HEALTH CARE EDUCATION/TRAINING PROGRAM

## 2025-05-15 PROCEDURE — 90471 IMMUNIZATION ADMIN: CPT | Performed by: STUDENT IN AN ORGANIZED HEALTH CARE EDUCATION/TRAINING PROGRAM

## 2025-05-15 PROCEDURE — 90715 TDAP VACCINE 7 YRS/> IM: CPT | Performed by: STUDENT IN AN ORGANIZED HEALTH CARE EDUCATION/TRAINING PROGRAM

## 2025-05-15 RX ORDER — VENLAFAXINE 75 MG/1
75 TABLET ORAL DAILY
Qty: 30 TABLET | Refills: 2 | COMMUNITY
Start: 2025-05-15

## 2025-05-15 RX ORDER — VENLAFAXINE HYDROCHLORIDE 150 MG/1
150 CAPSULE, EXTENDED RELEASE ORAL DAILY
Qty: 30 CAPSULE | Refills: 3 | COMMUNITY
Start: 2025-05-15 | End: 2025-09-12

## 2025-05-15 NOTE — PROGRESS NOTES
Subjective   Patient ID: Yecenia Porter is a 64 y.o. female who presents for the following    Assessment/Plan   Preventative Medicine (May 2025)  -Not UTD on vaccines.   -Flu and PNA shot to be October 2024  -MMG to be done by Oncology. Currently in remission from breast ca (R s/p mastectomy).   -Colon Cancer: colonoscopy done in March 2023. 1 hyperplastic polyp removed. Repeat this year   -Lung Cancer Screen: CT w/ IV contrast 11/18/24 no e/o nodules or mass  -PHQ2: 0; controlled on effexor   -Alcohol screen negative     Hx of Cardiac Cath  Non-obstructive CAD   -S/P Cardiac cath on 4/4/25 which did not show obstructive CAD   -TTE: LVEF normal (3/2025)   -Nuclear stress test done in March 2025  -Continue statin and ASA      #Breast Ca   -Dx in 2019  -s/p mastectomy in 2021   -currently in clinical remission  -Currently on arimidex only  -Follows with Dr Arciniega at [facility]      #Hypothyroidism  -TSH normal in Nov 2024  -Currently on synthroid 50mcg     #Osteoporosis  -DEXA in Oct 2023 confirmed  -Ca 9.5, Vit d 25 (11/24)  Plan:  -Continue fosfomax  -Continue vit d supplementation  -Repeat DEXA this year (2025). Check vitamin D/Calcium prior.      #HLD  - in March 2025  -Lipitor increased to 80mg PO daily   -Continue statin      #Elevated BP without Dx of HTN   -Previously had normal BP readings. Could be a component of [facility].  -Asymptomatic  PLAN  -Ambulatory monitoring   -Decrease salt intake  -Improve diet, advised on exercise  -Continue to monitor      #Likely COPD   -Office spirometry shows severe obstruction  -PFTs reviewed and show restrictive defect  PLAN  -Continue albuterol PRN and Trelegy  -Smoking cessation advice   -Discussed repeating PFT due to using trelegy morning of PFT or follow up with pulm. Declining both at this time.     #Hx of VTE  -Continue Eliquis     Obesity (BMI 31)  -During our session, we discussed your weight loss goals and the importance of maintaining a calorie deficit to  achieve these. I recommend tracking your daily calorie intake to help you stay within a target that supports weight loss. Incorporating at least 150 minutes of aerobic exercise per week, such as brisk walking, jogging, or cycling, will further aid your efforts. We will revisit your progress during our next appointment and make any necessary adjustments to your plan.  -Contrave to be started. Referred to clinical pharmacy.   -Will discontinue effexor once on contrave.   -RTC in 3 months after starting contrave.     Tobacco Use Disorder  -She is interested in quitting.  Has cut down tobacco use significantly.  - Discussed Chantix versus Wellbutrin.  - Currently taking effexor, still trying to wean off   - Reports Nicotine replacement therapy did not help  - Approximately 6-minute spent on tobacco counseling    HPI  63 yo F presents for physical and follow up.     Currently doing well.   Preventative care discussed with patient.     Interested in weight loss. Discussed GLP1RA vs contrave. Pt is interested in contrave for weight loss. Side effect profile discussed. Calorie  counting discussed.     Recently had cardiac cath due to abnormal stress test. Non-obstructive CAD seen. Pt had statin increased to max dosage and was started on ASA 81mg PO daily. No recurrent CP reported.     Compliant with trelegy for COPD. No flare ups noted.     No bleeding or recurrent VTE on eliquis reported.     Denies fevers, chills, weight loss, lightheadedness, dizziness, vision changes, sore throat, runny nose, CP, SOB, cough, palpitations, n/v/d, abd pain, black/bloody stools, arthralgias, mood disturbance, or new numbness/weakness/tingling in arms/legs/face.      PMH: osteoporosis, breast ca, HLD, hypothyroidism, MDD/BENTLEY, abdominal aorta stenting, abdominal aorta/iliac thromboembolectomy  Surgeries: See below     Family Hx  -Maternal: unknown  -Paternal: DM in father and sister. CAD in father   -Cancer: colon cancer in father, sister  "with breast cancer      Social Hx  T: down to 1-2 cigarettes daily. Most she ever smoked was 1ppd. Started smoking at age 16. About 46 py smoking hx  A: denies  D: denies      Personal Hx  -/  -  -2 children    Visit Vitals  /86   Pulse 96   Ht 1.6 m (5' 3\")   Wt 81.6 kg (180 lb)   SpO2 94%   BMI 31.89 kg/m²   Smoking Status Every Day   BSA 1.9 m²     PHYSICAL EXAM   General: NAD. NCAT. Aox3. Obese.   HEENT: PERRLA. EOMI. MMM. Nares patent bl.  Cardiovascular: RRR. No MRG. S1/S2 wnl.   Respiratory: CTABL. No acute respiratory distress.   GI: Soft, NT abdomen. BS present x 4.   MSK: ROM x 4. CTLS non-tender.   Extremities: No edema. Cap refill < 2 sec.   Skin: No rashes or bruises. S/p mastectomy for breast ca.   Neuro: Aox3. Cranial Nerves grossly intact. Motor/sensory wnl.   Psych: Mood wnl.      REVIEW OF SYSTEMS   ROS: 12 systems reviewed and negative except per HPI above    Allergies   Allergen Reactions    Prozac [Fluoxetine] Agitation    Gabapentin Other       Current Outpatient Medications   Medication Sig Dispense Refill    albuterol (ProAir HFA) 90 mcg/actuation inhaler Inhale 2 puffs every 4 hours if needed for wheezing or shortness of breath. 8.5 g 5    alendronate (Fosamax) 70 mg tablet See Instructions, TAKE 1 TABLET BY MOUTH EVERY MONDAY 30 MINUTES BEFORE THE AM MEAL WITH WATER. STAY UPRIGHT FOR 30-60 MINUTES AFTER TAKING, 12 tabs, , [id number], Instructions Replace Required Details, TAKE 1 TABLET BY MOUTH EVERY...      anastrozole (Arimidex) 1 mg tablet 1 tablet (1 mg total) once daily at bedtime.      aspirin 81 mg chewable tablet Chew 1 tablet (81 mg) once daily. 90 tablet 3    atorvastatin (Lipitor) 80 mg tablet Take 1 tablet (80 mg) by mouth once daily. 90 tablet 3    Eliquis 5 mg tablet Take 1 tablet (5 mg) by mouth 2 times a day.      fluticasone-umeclidin-vilanter (Trelegy Ellipta) 100-62.5-25 mcg blister with device Inhale 1 " puff once daily. 28 each 2    levothyroxine (Synthroid, Levoxyl) 50 mcg tablet Take 1 tablet (50 mcg) by mouth early in the morning.. Take on an empty stomach at the same time each day, either 30 to 60 minutes prior to breakfast      venlafaxine (Effexor) 75 mg tablet Take 1 tablet (75 mg) by mouth once daily. 90 tablet 1    venlafaxine XR (Effexor-XR) 150 mg 24 hr capsule Take 1 capsule (150 mg) by mouth once daily. Take with food. 30 capsule 3     No current facility-administered medications for this visit.       Objective     Office Visit on 04/23/2025   Component Date Value Ref Range Status    Ventricular Rate 04/23/2025 89  BPM Final    Atrial Rate 04/23/2025 89  BPM Final    IN Interval 04/23/2025 136  ms Final    QRS Duration 04/23/2025 78  ms Final    QT Interval 04/23/2025 370  ms Final    QTC Calculation(Bazett) 04/23/2025 450  ms Final    P Axis 04/23/2025 49  degrees Final    R Axis 04/23/2025 18  degrees Final    T Axis 04/23/2025 14  degrees Final    QRS Count 04/23/2025 14  beats Final    Q Onset 04/23/2025 228  ms Final    P Onset 04/23/2025 160  ms Final    P Offset 04/23/2025 212  ms Final    T Offset 04/23/2025 413  ms Final    QTC Fredericia 04/23/2025 421  ms Final   Admission on 04/04/2025, Discharged on 04/04/2025   Component Date Value Ref Range Status    Protime 04/04/2025 10.5  9.8 - 12.4 seconds Final    INR 04/04/2025 1.0  0.9 - 1.1 Final    Ventricular Rate 04/04/2025 95  BPM Final    Atrial Rate 04/04/2025 95  BPM Final    IN Interval 04/04/2025 110  ms Final    QRS Duration 04/04/2025 92  ms Final    QT Interval 04/04/2025 378  ms Final    QTC Calculation(Bazett) 04/04/2025 475  ms Final    P Axis 04/04/2025 0  degrees Final    R Axis 04/04/2025 -7  degrees Final    T [place] 04/04/2025 21  degrees Final    QRS Count 04/04/2025 16  beats Final    Q Onset 04/04/2025 213  ms Final    P Onset 04/04/2025 158  ms Final    P Offset 04/04/2025 200  ms Final    T Offset 04/04/2025 402  ms Final     QTC Fredericia 04/04/2025 440  ms Final   Hospital Outpatient Visit on 03/24/2025   Component Date Value Ref Range Status    AV pk randell 03/24/2025 1.11  m/s Final    AV mn grad 03/24/2025 3  mmHg Final    LVOT diam 03/24/2025 2.04  cm Final    LA vol index A/L 03/24/2025 22.2  ml/m2 Final    Tricuspid annular plane systolic e* 03/24/2025 2.0  cm Final    LV EF 03/24/2025 64  % Final    RV free wall pk S' 03/24/2025 0.11  cm/s Final    LVIDd 03/24/2025 4.13  cm Final    RVSP 03/24/2025 19.6  mmHg Final    Aortic Valve Area by Continuity of* 03/24/2025 2.22  cm2 Final    Aortic Valve Area by Continuity of* 03/24/2025 2.41  cm2 Final    AV pk grad 03/24/2025 5  mmHg Final    LV A4C EF 03/24/2025 62.2   Final   Office Visit on 03/19/2025   Component Date Value Ref Range Status    CHOLESTEROL, TOTAL 03/22/2025 203 (H)  <200 mg/dL Final    HDL CHOLESTEROL 03/22/2025 49 (L)  > OR = 50 mg/dL Final    TRIGLYCERIDES 03/22/2025 149  <150 mg/dL Final    LDL-CHOLESTEROL 03/22/2025 128 (H)  mg/dL (calc) Final    CHOL/HDLC RATIO 03/22/2025 4.1  <5.0 (calc) Final    NON HDL CHOLESTEROL 03/22/2025 154 (H)  <130 mg/dL (calc) Final    WHITE BLOOD CELL COUNT 03/22/2025 6.9  3.8 - 10.8 Thousand/uL Final    RED BLOOD CELL COUNT 03/22/2025 4.86  3.80 - 5.10 Million/uL Final    HEMOGLOBIN 03/22/2025 15.4  11.7 - 15.5 g/dL Final    HEMATOCRIT 03/22/2025 46.8 (H)  35.0 - 45.0 % Final    MCV 03/22/2025 96.3  80.0 - 100.0 fL Final    MCH 03/22/2025 31.7  27.0 - 33.0 pg Final    MCHC 03/22/2025 32.9  32.0 - 36.0 g/dL Final    RDW 03/22/2025 13.0  11.0 - 15.0 % Final    PLATELET COUNT 03/22/2025 243  140 - 400 Thousand/uL Final    MPV 03/22/2025 10.7  7.5 - 12.5 fL Final    GLUCOSE 03/22/2025 106 (H)  65 - 99 mg/dL Final    UREA NITROGEN (BUN) 03/22/2025 13  7 - 25 mg/dL Final    CREATININE 03/22/2025 0.84  0.50 - 1.05 mg/dL Final    EGFR 03/22/2025 78  > OR = 60 mL/min/1.73m2 Final    SODIUM 03/22/2025 143  135 - 146 mmol/L Final    POTASSIUM  03/22/2025 4.9  3.5 - 5.3 mmol/L Final    CHLORIDE 03/22/2025 106  98 - 110 mmol/L Final    CARBON DIOXIDE 03/22/2025 27  20 - 32 mmol/L Final    ELECTROLYTE BALANCE 03/22/2025 10  7 - 17 mmol/L (calc) Final    CALCIUM 03/22/2025 9.3  8.6 - 10.4 mg/dL Final    PROTEIN, TOTAL 03/22/2025 6.8  6.1 - 8.1 g/dL Final    ALBUMIN 03/22/2025 4.4  3.6 - 5.1 g/dL Final    BILIRUBIN, TOTAL 03/22/2025 0.4  0.2 - 1.2 mg/dL Final    ALKALINE PHOSPHATASE 03/22/2025 97  37 - 153 U/L Final    AST 03/22/2025 14  10 - 35 U/L Final    ALT 03/22/2025 18  6 - 29 U/L Final       Radiology: Reviewed imaging in powerchart.  No results found.    Family History   Problem Relation Name Age of Onset    Other (HTN) Mother      Other (HLD) Mother      Diabetes Father      Heart disease Father      Brain Aneurysm Father      Colon cancer Father      Diabetes Sister      Breast cancer Sister      No Known Problems Maternal Grandmother      No Known Problems Maternal Grandfather      No Known Problems Paternal Grandmother      Diabetes Paternal Grandfather       Social History     Socioeconomic History    Marital status:    Tobacco Use    Smoking status: Every Day     Current packs/day: 0.25     Average packs/day: 0.3 packs/day for 49.4 years (12.3 ttl pk-yrs)     Types: Cigarettes     Start date: 1/1/1976     Passive exposure: Past    Smokeless tobacco: Never   Vaping Use    Vaping status: Never Used   Substance and Sexual Activity    Alcohol use: Never    Drug use: Never    Sexual activity: Not Currently     Partners: Male     Birth control/protection: None     Past Medical History:   Diagnosis Date    Anxiety     Mcintyre's esophagus     Breast cancer     Cervical disc disease     Depression     Easy bruising     Hx antineoplastic chemo     Hyperlipidemia     Hypothyroidism     Lumbar disc disease     Personal history of irradiation     Personal history of other mental and behavioral disorders     History of anxiety    Spinal stenosis       Past Surgical History:   Procedure Laterality Date    BACK SURGERY      Cervical and lumbar (pt does not know what was done).    CARDIAC CATHETERIZATION N/A 2025    Procedure: Left Heart Cath with Coronary Angiography and LV;  Surgeon: Mart Leach MD PhD;  Location: [facility];  Service: Cardiovascular;  Laterality: N/A;  time 1100am     SECTION, LOW TRANSVERSE      COLONOSCOPY      MASTECTOMY Right 2020    ORIF WRIST FRACTURE      OTHER SURGICAL HISTORY      Abdominal aortic stent graft/tube graft for aortic dissection    OTHER SURGICAL HISTORY      Abdominal aortic and bilateral iliac thromboembolectomy    OTHER SURGICAL HISTORY      Right iliac covered stent; left iliac artery covered stent; right common femoral thrombectomy with patch; repair right common femoral artery dissection; repair blood vessel right femoral    UPPER GASTROINTESTINAL ENDOSCOPY         Charting was completed using voice recognition technology and may include unintended errors.

## 2025-05-16 ENCOUNTER — TELEPHONE (OUTPATIENT)
Dept: PRIMARY CARE | Facility: CLINIC | Age: 65
End: 2025-05-16
Payer: COMMERCIAL

## 2025-05-16 DIAGNOSIS — E55.9 VITAMIN D DEFICIENCY: ICD-10-CM

## 2025-05-16 LAB
25(OH)D3+25(OH)D2 SERPL-MCNC: 12 NG/ML (ref 30–100)
EST. AVERAGE GLUCOSE BLD GHB EST-MCNC: 131 MG/DL
EST. AVERAGE GLUCOSE BLD GHB EST-SCNC: 7.3 MMOL/L
HBA1C MFR BLD: 6.2 %
T4 FREE SERPL-MCNC: 1.2 NG/DL (ref 0.8–1.8)
TSH SERPL-ACNC: 4.74 MIU/L (ref 0.4–4.5)

## 2025-05-16 RX ORDER — ERGOCALCIFEROL 1.25 MG/1
1.25 CAPSULE ORAL WEEKLY
Qty: 12 CAPSULE | Refills: 0 | Status: SHIPPED | OUTPATIENT
Start: 2025-05-16 | End: 2025-08-08

## 2025-05-16 NOTE — TELEPHONE ENCOUNTER
----- Message from Sveta Mcknight sent at 5/16/2025 12:15 PM EDT -----  Vitamin D Deficiency. Rx 29246GB vitamin D once weekly x 3 months. Repeat levels in 3 months     A1c in pre-diabetic range. Advise low fat, low carb diet. If interested in medication to help with glycemic control, please set up follow up.     TSH shows subclinical hypothyroidism. Ensure she is compliant with home synthroid. If symptomatic for hypothyroidism, such as fatigue, cold intolerance, weight gain, can discuss increasing synthroid.     ----- Message -----  From: [place] Results In  Sent: 5/16/2025   1:04 AM EDT  To: Sveta Mcknight MD

## 2025-05-20 ENCOUNTER — TELEPHONE (OUTPATIENT)
Dept: PRIMARY CARE | Facility: CLINIC | Age: 65
End: 2025-05-20
Payer: COMMERCIAL

## 2025-05-20 NOTE — TELEPHONE ENCOUNTER
----- Message from Sveta Mcknight sent at 5/20/2025 11:26 AM EDT -----  Multiple polyps removed. Await pathology of these. Follow up results with GI.     ----- Message -----  From: Zara Cherry MA  Sent: 5/16/2025   3:43 PM EDT  To: Sveta Mcknight MD

## 2025-05-27 ENCOUNTER — APPOINTMENT (OUTPATIENT)
Dept: PHARMACY | Facility: HOSPITAL | Age: 65
End: 2025-05-27
Payer: COMMERCIAL

## 2025-05-27 DIAGNOSIS — E66.811 CLASS 1 OBESITY WITH SERIOUS COMORBIDITY AND BODY MASS INDEX (BMI) OF 31.0 TO 31.9 IN ADULT, UNSPECIFIED OBESITY TYPE: ICD-10-CM

## 2025-05-27 RX ORDER — DIPHENHYDRAMINE HCL 25 MG
25 CAPSULE ORAL NIGHTLY PRN
COMMUNITY

## 2025-05-27 NOTE — PROGRESS NOTES
Clinical Pharmacy Appointment    Patient ID: Yecenia Porter is a 64 y.o. female who presents for Weight Management.    Pt is here for First appointment.     Referring Provider: Sveta Mcknight MD  PCP: Sveta Mcknight MD  Last visit with PCP: 5/15/25   Next visit with PCP: Not Scheduled     Subjective   Medication Reconciliation:  Changed: None  Added:   Zzzquil PRN sleep  Discontinued: None    Drug Interactions  No relevant drug interactions were noted.    Medication System Management  Patient's preferred pharmacy: [facility][id number] [place], [place]  Affordability/Accessibility: Contrave plan/benefit exclusions. GLP1s plan/benefit exclusions    HPI    Patient is a 64 y.o. female presenting to an initial clinical pharmacy visit for weight management.     Get back to comfortable ~130 lbs. Baseline weight 180 lbs. Baseline BMI 31.89 kg/m2. No other weight loss medications. No formal diet programs.     Diet  B: Egg, banana, yogurt   L: Leftovers, sometimes does not eat   D: half baked potato, fish/chicken or beef (2x per week), and veggie  Sn: No snacks  Dr: No sugar beverages    Exercise  - Walking   - Exercise Bike x 10 min qam + 10 min qpm     Objective   Allergies[1]  Social History     Social History Narrative    Not on file      Medication Review  Current Outpatient Medications   Medication Instructions    albuterol (ProAir HFA) 90 mcg/actuation inhaler 2 puffs, inhalation, Every 4 hours PRN    alendronate (Fosamax) 70 mg tablet See Instructions, TAKE 1 TABLET BY MOUTH EVERY MONDAY 30 MINUTES BEFORE THE AM MEAL WITH WATER. STAY UPRIGHT FOR 30-60 MINUTES AFTER TAKING, 12 tabs, , [facility] #[id number], Instructions Replace Required Details, TAKE 1 TABLET BY MOUTH EVERY...    anastrozole (Arimidex) 1 mg tablet 1 tablet (1 mg total) once daily at bedtime.    aspirin 81 mg, oral, Daily    atorvastatin (LIPITOR) 80 mg, oral, Daily    diphenhydrAMINE (BENADRYL) 25 mg, Nightly PRN    Eliquis 5 mg, 2 times  "daily    ergocalciferol (VITAMIN D-2) 1.25 mg, oral, Weekly    fluticasone-umeclidin-vilanter (Trelegy Ellipta) 100-62.5-25 mcg blister with device 1 puff, inhalation, Daily    levothyroxine (SYNTHROID, LEVOXYL) 50 mcg, Daily    naltrexone-bupropion (Contrave) 8-90 mg ER tablet Take 1 tablet by mouth once daily days 1-7, then 1 tab twice daily days 8-14, then 2 tabs in the morning+ 1 tab in the evening days 15-22, then 2 tabs twice daily thereafter    venlafaxine (EFFEXOR) 75 mg, oral, Daily    venlafaxine XR (EFFEXOR-XR) 150 mg, oral, Daily, Take with food.      Vitals  BP Readings from Last 2 Encounters:   05/15/25 130/86   04/23/25 128/78     BMI Readings from Last 1 Encounters:   05/15/25 31.89 kg/m²      Labs  A1C  Lab Results   Component Value Date    HGBA1C 6.2 (H) 05/15/2025     BMP  Lab Results   Component Value Date    CALCIUM 9.3 03/22/2025     03/22/2025    K 4.9 03/22/2025    CO2 27 03/22/2025     03/22/2025    BUN 13 03/22/2025    CREATININE 0.84 03/22/2025    EGFR 78 03/22/2025     LFTs  Lab Results   Component Value Date    ALT 18 03/22/2025    AST 14 03/22/2025    ALKPHOS 97 03/22/2025    BILITOT 0.4 03/22/2025     FLP  Lab Results   Component Value Date    TRIG 149 03/22/2025    CHOL 203 (H) 03/22/2025    LDLCALC 128 (H) 03/22/2025    HDL 49 (L) 03/22/2025     Urine Microalbumin  No results found for: \"MICROALBCREA\"  Weight Management  Wt Readings from Last 3 Encounters:   05/15/25 81.6 kg (180 lb)   04/23/25 79.5 kg (175 lb 3.2 oz)   04/04/25 79.6 kg (175 lb 7.8 oz)      There is no height or weight on file to calculate BMI.     PATIENT EDUCATION/DISCUSSION:  - Counseled patient on MOA, expectations, side effects, duration of therapy, contraindications, administration, and monitoring parameters  - Answered all patient questions and concerns    Assessment/Plan   Problem List Items Addressed This Visit       Class 1 obesity with body mass index (BMI) of 31.0 to 31.9 in adult    Relevant " Medications    naltrexone-bupropion (Contrave) 8-90 mg ER tablet    Other Relevant Orders    Referral to Clinical Pharmacy   Insurance does not cover Contrave therefore ineligible for  PAP. Discussed Contrave [employer] Program for $99/month or savings card for $199/month. Per patient [employer] Program would be affordable and would like to move forward with that.   START Contrave 8-90 mg taking 1 tablet by mouth once daily days 1-7, then 1 tab twice daily days 8-14, then 2 tabs in the morning+ 1 tab in the evening days 15-22, then 2 tabs twice daily thereafter   Prescription sent to [facility] for [facility] Program  Will follow up 2 weeks after starting medication to make sure no issues with side effects while titrating up   Per PCP note patient is to stop Effexor with initiation of Contrave  CONTINUE making healthy diet and lifestyle choices  Recommended adequate protein intake and adding strength training to current exercise.  Discussed goal weight loss of 5% of body weight reduction after 3 months on the medication.   Provided patient with [facility] phone number for any additional questions or concerns [phone]    Follow up with Clinical Pharmacy Team 6/17/25 at 2pm     Time spent with pt: Total length of time 20 (minutes) of the encounter and more than 50% was spent counseling the patient.    Continue all meds under the continuation of care with the referring provider and clinical pharmacy team.    Please reach out to the Clinical Pharmacy Team if there are any further questions.     Verbal consent to manage patient's drug therapy was obtained from patient. They were informed they may decline to participate or withdraw from participation in pharmacy services at any time.    Mildred Palafox, CarolineD  Clinical Pharmacy Specialist   [phone]       [1]   Allergies  Allergen Reactions    Prozac [Fluoxetine] Agitation    Gabapentin Other

## 2025-06-02 ENCOUNTER — TELEPHONE (OUTPATIENT)
Dept: PRIMARY CARE | Facility: CLINIC | Age: 65
End: 2025-06-02
Payer: COMMERCIAL

## 2025-06-03 ENCOUNTER — TELEPHONE (OUTPATIENT)
Dept: PRIMARY CARE | Facility: CLINIC | Age: 65
End: 2025-06-03
Payer: COMMERCIAL

## 2025-06-03 DIAGNOSIS — E03.9 HYPOTHYROIDISM, UNSPECIFIED TYPE: ICD-10-CM

## 2025-06-03 RX ORDER — LEVOTHYROXINE SODIUM 50 UG/1
50 TABLET ORAL DAILY
Qty: 90 TABLET | Refills: 1 | Status: SHIPPED | OUTPATIENT
Start: 2025-06-03

## 2025-06-03 NOTE — TELEPHONE ENCOUNTER
Patient is wanting to know if it is ok to take contrave with the effexor or if she needs to stop it. Wants to make sure no side effects taking these together. Please advise.

## 2025-06-10 ENCOUNTER — TELEPHONE (OUTPATIENT)
Dept: PRIMARY CARE | Facility: CLINIC | Age: 65
End: 2025-06-10
Payer: COMMERCIAL

## 2025-06-10 NOTE — TELEPHONE ENCOUNTER
Patient started contrave on Sunday. Since then she has been fatigued / trouble staying awake, sweating, feels dissociated, vomiting. Her mom was over and stated she had a yellow tint to her. Please advise on recommendations.   Also - she wants to know if she continues taking vitamin d once weekly. If yes she needs refill.  In regards to her thyroid med she was on 75 mcg, wants to know why it was changed to 50.

## 2025-06-17 ENCOUNTER — APPOINTMENT (OUTPATIENT)
Dept: PHARMACY | Facility: HOSPITAL | Age: 65
End: 2025-06-17
Payer: COMMERCIAL

## 2025-06-17 DIAGNOSIS — E66.811 CLASS 1 OBESITY WITH SERIOUS COMORBIDITY AND BODY MASS INDEX (BMI) OF 31.0 TO 31.9 IN ADULT, UNSPECIFIED OBESITY TYPE: ICD-10-CM

## 2025-06-17 NOTE — PROGRESS NOTES
Clinical Pharmacy Appointment    Patient ID: Yecenia Porter is a 64 y.o. female who presents for Weight Management.    Pt is here for Follow Up appointment.     Referring Provider: Sveta Mcknight MD  PCP: Sveta Mcknight MD  Last visit with PCP: 5/15/25   Next visit with PCP: Not Scheduled     Subjective   Medication Reconciliation:  Changed: None  Added: None  Discontinued:   Contrave     Drug Interactions  No relevant drug interactions were noted.    Medication System Management  Patient's preferred pharmacy: [id number] [place], [place]  Affordability/Accessibility: Contrave plan/benefit exclusions. GLP1s plan/benefit exclusions    HPI    Patient is a 64 y.o. female presenting to a follow up clinical pharmacy visit for weight management. At last visit she was started on contrave. However, had significant side effects including fatigue, sweating, dissociation, and vomiting. Did not go to ER. All symptoms resolved after discontinuing the medication.     Get back to comfortable ~130 lbs. Baseline weight 180 lbs. Baseline BMI 31.89 kg/m2. No other weight loss medications. No formal diet programs.     Diet  B: Egg, banana, yogurt   L: Leftovers, sometimes does not eat   D: half baked potato, fish/chicken or beef (2x per week), and veggie  Sn: No snacks  Dr: No sugar beverages    Exercise  - Walking   - Exercise Bike x 10 min qam + 10 min qpm     Objective   Allergies[1]  Social History     Social History Narrative    Not on file      Medication Review  Current Outpatient Medications   Medication Instructions    albuterol (ProAir HFA) 90 mcg/actuation inhaler 2 puffs, inhalation, Every 4 hours PRN    alendronate (Fosamax) 70 mg tablet See Instructions, TAKE 1 TABLET BY MOUTH EVERY MONDAY 30 MINUTES BEFORE THE AM MEAL WITH WATER. STAY UPRIGHT FOR 30-60 MINUTES AFTER TAKING, 12 tabs, 84, [id number], Instructions Replace Required Details, TAKE 1 TABLET BY MOUTH EVERY...    anastrozole  "(Arimidex) 1 mg tablet 1 tablet (1 mg total) once daily at bedtime.    aspirin 81 mg, oral, Daily    atorvastatin (LIPITOR) 80 mg, oral, Daily    diphenhydrAMINE (BENADRYL) 25 mg, Nightly PRN    Eliquis 5 mg, 2 times daily    ergocalciferol (VITAMIN D-2) 1.25 mg, oral, Weekly    fluticasone-umeclidin-vilanter (Trelegy Ellipta) 100-62.5-25 mcg blister with device 1 puff, inhalation, Daily    levothyroxine (SYNTHROID, LEVOXYL) 50 mcg, oral, Daily, Take on an empty stomach at the same time each day, either 30 to 60 minutes prior to breakfast    venlafaxine (EFFEXOR) 75 mg, oral, Daily    venlafaxine XR (EFFEXOR-XR) 150 mg, oral, Daily, Take with food.      Vitals  BP Readings from Last 2 Encounters:   05/15/25 130/86   04/23/25 128/78     BMI Readings from Last 1 Encounters:   05/15/25 31.89 kg/m²      Labs  A1C  Lab Results   Component Value Date    HGBA1C 6.2 (H) 05/15/2025     BMP  Lab Results   Component Value Date    CALCIUM 9.3 03/22/2025     03/22/2025    K 4.9 03/22/2025    CO2 27 03/22/2025     03/22/2025    BUN 13 03/22/2025    CREATININE 0.84 03/22/2025    EGFR 78 03/22/2025     LFTs  Lab Results   Component Value Date    ALT 18 03/22/2025    AST 14 03/22/2025    ALKPHOS 97 03/22/2025    BILITOT 0.4 03/22/2025     FLP  Lab Results   Component Value Date    TRIG 149 03/22/2025    CHOL 203 (H) 03/22/2025    LDLCALC 128 (H) 03/22/2025    HDL 49 (L) 03/22/2025     Urine Microalbumin  No results found for: \"MICROALBCREA\"  Weight Management  Wt Readings from Last 3 Encounters:   05/15/25 81.6 kg (180 lb)   04/23/25 79.5 kg (175 lb 3.2 oz)   04/04/25 79.6 kg (175 lb 7.8 oz)      There is no height or weight on file to calculate BMI.     PATIENT EDUCATION/DISCUSSION:  - Counseled patient on MOA, expectations, side effects, duration of therapy, contraindications, administration, and monitoring parameters  - Answered all patient questions and concerns    Assessment/Plan   Problem List Items Addressed This " Visit       Class 1 obesity with body mass index (BMI) of 31.0 to 31.9 in adult   Significant side effects with Contrave. All side effects resolved after discontinuing the medication. Briefly discussed GLP1 medications. Insurance does not cover them so reviewed cash pay options. Patient would like to discuss with PCP prior to starting any other weight loss medications.   STOP Contrave due to side effects   CONTINUE making healthy diet and lifestyle choices  Recommended adequate protein intake and adding strength training to current exercise.  Discussed goal weight loss of 5% of body weight reduction after 3 months on the medication.   Provided patient with [facility] phone number for any additional questions or concerns [phone]    Follow up with Clinical Pharmacy Team as needed by patient or PCP     Time spent with pt: Total length of time 20 (minutes) of the encounter and more than 50% was spent counseling the patient.    Continue all meds under the continuation of care with the referring provider and clinical pharmacy team.    Please reach out to the Clinical Pharmacy Team if there are any further questions.     Verbal consent to manage patient's drug therapy was obtained from patient. They were informed they may decline to participate or withdraw from participation in pharmacy services at any time.    Mildred Palafox, PharmD  Clinical Pharmacy Specialist   [phone]         [1]   Allergies  Allergen Reactions    Prozac [Fluoxetine] Agitation    Gabapentin Other

## 2025-07-30 ENCOUNTER — RESULTS FOLLOW-UP (OUTPATIENT)
Dept: PRIMARY CARE | Facility: CLINIC | Age: 65
End: 2025-07-30
Payer: COMMERCIAL

## 2025-07-31 NOTE — TELEPHONE ENCOUNTER
----- Message from Sveta Mcknight sent at 7/30/2025  9:01 PM EDT -----  Still has significant osteoporosis. Continue fosamax with daily vitamin d and calcium supplementation   ----- Message -----  From: Shoshana Celestin - Imaging Results In  Sent: 7/29/2025   6:30 AM EDT  To: Sveta Mcknight MD

## 2025-07-31 NOTE — TELEPHONE ENCOUNTER
Dr. Arciniega took her off the fosamax and is having her do an infusion every 6 months. Please advise if any other recommendations.

## (undated) DEVICE — GUIDEWIRE, INQWIRE, 3MM J, .035 X 210CM, FIXED

## (undated) DEVICE — DRESSING, TRANSPARENT, TEGADERM, 4 X 4-3/4 IN

## (undated) DEVICE — STRIP, SKIN CLOSURE, STERI STRIP, REINFORCED, 0.5 X 4 IN

## (undated) DEVICE — EVACUATOR, WOUND, SUCTION, CLOSED, JACKSON-PRATT, 100 CC, SILICONE

## (undated) DEVICE — TOWEL PACK, STERILE, 4/PACK, BLUE

## (undated) DEVICE — TR BAND, RADIAL COMPRESSION, STANDARD, 24CM

## (undated) DEVICE — SHEATH, GLIDESHEATH, SLENDER, 6FR 10CM

## (undated) DEVICE — DRAIN, JP CHANNEL, 15 FR, RND, W/TROCAR

## (undated) DEVICE — ELECTRODE, MULTIFUNCTION, QUICK-COMBO, EDGE SYSTEM, 2 FT

## (undated) DEVICE — SOLUTION, IRRIGATION, SODIUM CHLORIDE 0.9%, 1000 ML, POUR BOTTLE

## (undated) DEVICE — SUTURE, MONOCRYL, 4-0, 18 IN, PS2, UNDYED

## (undated) DEVICE — APPLICATOR, CHLORAPREP, W/ORANGE TINT, 26ML

## (undated) DEVICE — CATHETER, OPTITORQUE, 5FR, TIG, 1H/100CM

## (undated) DEVICE — SUPPORT, MAMMARY, THERAPEUTIC, SURGI-BRA, LARGE, LF

## (undated) DEVICE — SOLUTION, IRRIGATION, STERILE WATER, 1000 ML, POUR BOTTLE

## (undated) DEVICE — Device

## (undated) DEVICE — DRESSING, ANTIMICROBIAL, W/7 MM CENTER HOLE, W/CHLORHEXIDINE GLUCONATE, BIOPATCH, 1 IN DISK

## (undated) DEVICE — SUTURE, PROLENE 4-0, 18IN, PS2, BLUE MONO

## (undated) DEVICE — DRESSING, PREVENA, PEEL AND PLACE, 35CM

## (undated) DEVICE — SUTURE, VICRYL, 2-0, 27 IN, SH, UNDYED

## (undated) DEVICE — COVER, MAYO STAND, W/PAD, 23 IN, DISPOSABLE, PLASTIC, LF, STERILE

## (undated) DEVICE — PROBE COVER, INTRAOPERATIVE, 13 X 244CM (5 X 96IN)

## (undated) DEVICE — GLOVE, SURGICAL, PROTEXIS PI , 5.5, PF, LF

## (undated) DEVICE — SUTURE, VICRYL, 3-0, 27 IN, SH

## (undated) DEVICE — THERAPY UNIT, PREVENA PLUS 125

## (undated) DEVICE — MARKER, SKIN, DUAL TIP, W/RULER AND LABEL